# Patient Record
Sex: MALE | Race: WHITE | Employment: FULL TIME | URBAN - METROPOLITAN AREA
[De-identification: names, ages, dates, MRNs, and addresses within clinical notes are randomized per-mention and may not be internally consistent; named-entity substitution may affect disease eponyms.]

---

## 2017-03-28 ENCOUNTER — HOSPITAL ENCOUNTER (EMERGENCY)
Age: 57
Discharge: HOME | End: 2017-03-28
Attending: EMERGENCY MEDICINE
Payer: COMMERCIAL

## 2017-03-28 ENCOUNTER — APPOINTMENT (OUTPATIENT)
Dept: GENERAL RADIOLOGY | Age: 57
End: 2017-03-28
Attending: EMERGENCY MEDICINE
Payer: COMMERCIAL

## 2017-03-28 VITALS
TEMPERATURE: 98.1 F | HEART RATE: 64 BPM | SYSTOLIC BLOOD PRESSURE: 122 MMHG | RESPIRATION RATE: 10 BRPM | BODY MASS INDEX: 27.07 KG/M2 | HEIGHT: 69 IN | OXYGEN SATURATION: 96 % | DIASTOLIC BLOOD PRESSURE: 77 MMHG | WEIGHT: 182.76 LBS

## 2017-03-28 DIAGNOSIS — R07.89 ATYPICAL CHEST PAIN: Primary | ICD-10-CM

## 2017-03-28 LAB
ALBUMIN SERPL BCP-MCNC: 4 G/DL (ref 3.5–5)
ALBUMIN/GLOB SERPL: 1.1 {RATIO} (ref 1.1–2.2)
ALP SERPL-CCNC: 59 U/L (ref 45–117)
ALT SERPL-CCNC: 39 U/L (ref 12–78)
ANION GAP BLD CALC-SCNC: 10 MMOL/L (ref 5–15)
AST SERPL W P-5'-P-CCNC: 20 U/L (ref 15–37)
ATRIAL RATE: 76 BPM
BASOPHILS # BLD AUTO: 0.1 K/UL (ref 0–0.1)
BASOPHILS # BLD: 1 % (ref 0–1)
BILIRUB SERPL-MCNC: 0.5 MG/DL (ref 0.2–1)
BUN SERPL-MCNC: 24 MG/DL (ref 6–20)
BUN/CREAT SERPL: 18 (ref 12–20)
CALCIUM SERPL-MCNC: 8.4 MG/DL (ref 8.5–10.1)
CALCULATED P AXIS, ECG09: 35 DEGREES
CALCULATED R AXIS, ECG10: 55 DEGREES
CALCULATED T AXIS, ECG11: 64 DEGREES
CHLORIDE SERPL-SCNC: 104 MMOL/L (ref 97–108)
CK SERPL-CCNC: 101 U/L (ref 39–308)
CO2 SERPL-SCNC: 26 MMOL/L (ref 21–32)
CREAT SERPL-MCNC: 1.32 MG/DL (ref 0.7–1.3)
D DIMER PPP FEU-MCNC: 0.26 MG/L FEU (ref 0–0.65)
DIAGNOSIS, 93000: NORMAL
EOSINOPHIL # BLD: 0.2 K/UL (ref 0–0.4)
EOSINOPHIL NFR BLD: 2 % (ref 0–7)
ERYTHROCYTE [DISTWIDTH] IN BLOOD BY AUTOMATED COUNT: 13 % (ref 11.5–14.5)
GLOBULIN SER CALC-MCNC: 3.5 G/DL (ref 2–4)
GLUCOSE SERPL-MCNC: 83 MG/DL (ref 65–100)
HCT VFR BLD AUTO: 41.8 % (ref 36.6–50.3)
HGB BLD-MCNC: 14.2 G/DL (ref 12.1–17)
LIPASE SERPL-CCNC: 133 U/L (ref 73–393)
LYMPHOCYTES # BLD AUTO: 22 % (ref 12–49)
LYMPHOCYTES # BLD: 1.7 K/UL (ref 0.8–3.5)
MCH RBC QN AUTO: 29.6 PG (ref 26–34)
MCHC RBC AUTO-ENTMCNC: 34 G/DL (ref 30–36.5)
MCV RBC AUTO: 87.3 FL (ref 80–99)
MONOCYTES # BLD: 1 K/UL (ref 0–1)
MONOCYTES NFR BLD AUTO: 13 % (ref 5–13)
NEUTS SEG # BLD: 5 K/UL (ref 1.8–8)
NEUTS SEG NFR BLD AUTO: 62 % (ref 32–75)
P-R INTERVAL, ECG05: 122 MS
PLATELET # BLD AUTO: 179 K/UL (ref 150–400)
POTASSIUM SERPL-SCNC: 3.7 MMOL/L (ref 3.5–5.1)
PROT SERPL-MCNC: 7.5 G/DL (ref 6.4–8.2)
Q-T INTERVAL, ECG07: 368 MS
QRS DURATION, ECG06: 96 MS
QTC CALCULATION (BEZET), ECG08: 414 MS
RBC # BLD AUTO: 4.79 M/UL (ref 4.1–5.7)
SODIUM SERPL-SCNC: 140 MMOL/L (ref 136–145)
TROPONIN I SERPL-MCNC: <0.04 NG/ML
VENTRICULAR RATE, ECG03: 76 BPM
WBC # BLD AUTO: 7.9 K/UL (ref 4.1–11.1)

## 2017-03-28 PROCEDURE — 85025 COMPLETE CBC W/AUTO DIFF WBC: CPT | Performed by: EMERGENCY MEDICINE

## 2017-03-28 PROCEDURE — 99285 EMERGENCY DEPT VISIT HI MDM: CPT

## 2017-03-28 PROCEDURE — 93005 ELECTROCARDIOGRAM TRACING: CPT

## 2017-03-28 PROCEDURE — 85379 FIBRIN DEGRADATION QUANT: CPT | Performed by: EMERGENCY MEDICINE

## 2017-03-28 PROCEDURE — 82550 ASSAY OF CK (CPK): CPT | Performed by: EMERGENCY MEDICINE

## 2017-03-28 PROCEDURE — 80053 COMPREHEN METABOLIC PANEL: CPT | Performed by: EMERGENCY MEDICINE

## 2017-03-28 PROCEDURE — 83690 ASSAY OF LIPASE: CPT | Performed by: EMERGENCY MEDICINE

## 2017-03-28 PROCEDURE — 71010 XR CHEST PORT: CPT

## 2017-03-28 PROCEDURE — 36415 COLL VENOUS BLD VENIPUNCTURE: CPT | Performed by: EMERGENCY MEDICINE

## 2017-03-28 PROCEDURE — 84484 ASSAY OF TROPONIN QUANT: CPT | Performed by: EMERGENCY MEDICINE

## 2017-03-28 NOTE — ED NOTES
Patient arrives for left sided chest pain since Wednesday. Reports that if feels like a pressure. Denies SOB, n/v, diaphoresis, syncope with the episodes. Denies swelling on legs. Patient has no medical hx. No distress noted. Will continue to monitor.

## 2017-03-28 NOTE — ED PROVIDER NOTES
HPI Comments: Maty Casarez is a 61 y.o. male with history of cholecystectomy who presents ambulatory to ED c/o intermittent episodes of 2/10 left sided chest pain for the past 6 days, along with associated light headedness. The pain has been worsening since his arrival to the ED. Pt also notes ongoing issues with right shoulder pain that is worse with movement. Pt states the episodes last for a few minutes before subsiding, and it is exacerbated by deep inspiration. The chest pain is not exacerbated by exertion, movement, or by eating. Pt denies any shortness of breath, nausea, or vomiting. He has never been a smoker, and he has never been evaluated by cardiology. Pt's father had a \"mild heart attack\" last year. There are no other complaints, changes or physical findings at this time. The history is provided by the patient. No  was used. History reviewed. No pertinent past medical history. Past Surgical History:   Procedure Laterality Date    HX CHOLECYSTECTOMY           History reviewed. No pertinent family history. Social History     Social History    Marital status:      Spouse name: N/A    Number of children: N/A    Years of education: N/A     Occupational History    Not on file. Social History Main Topics    Smoking status: Never Smoker    Smokeless tobacco: Not on file    Alcohol use No    Drug use: No    Sexual activity: Not on file     Other Topics Concern    Not on file     Social History Narrative    No narrative on file         ALLERGIES: Review of patient's allergies indicates not on file. Review of Systems   Respiratory: Negative for shortness of breath. Cardiovascular: Positive for chest pain. Gastrointestinal: Negative for nausea and vomiting. Musculoskeletal: Positive for arthralgias. Neurological: Positive for light-headedness. All other systems reviewed and are negative.       Patient Vitals for the past 12 hrs: Temp Pulse Resp BP SpO2   03/28/17 1700 - 64 12 121/77 97 %   03/28/17 1630 - 60 10 122/75 96 %   03/28/17 1600 - 63 11 - 96 %   03/28/17 1530 - (!) 58 11 121/72 96 %   03/28/17 1500 - 60 11 118/73 97 %   03/28/17 1430 - 68 18 136/56 97 %   03/28/17 1332 98.1 °F (36.7 °C) 70 18 163/79 100 %       Physical Exam   Vital signs and nursing notes reviewed    CONSTITUTIONAL: Alert, in no apparent distress; well-developed; well-nourished. HEAD:  Normocephalic, atraumatic  EYES: PERRL; EOM's intact. ENTM: Nose: no rhinorrhea; Throat: no erythema or exudate, mucous membranes moist  Neck:  Supple. trachea is midline. RESP: Chest clear, equal breath sounds. - W/R/R  CV: S1 and S2 WNL; No murmurs, gallops or rubs. 2+ radial and DP pulses bilaterally. No reproducible chest wall tenderness. GI: non-distended, normal bowel sounds, abdomen soft and non-tender. No masses or organomegaly. : No costo-vertebral angle tenderness. BACK:  Non-tender, normal appearance  UPPER EXT:  Normal inspection. no joint or soft tissue swelling  LOWER EXT: No edema, no calf tenderness. NEURO: Alert and oriented x3, 5/5 strength and light touch sensation intact in bilateral upper and lower extremities. SKIN: No rashes; Warm and dry  PSYCH: Normal mood, normal affect    MDM  Number of Diagnoses or Management Options  Diagnosis management comments: 61year old male with no risk factors for ACS with atypical chest pain for 6 days. Will screen with xray, EKG, labs (including D-dimer). If reassuring will discharge home with cardiology follow up.         Amount and/or Complexity of Data Reviewed  Clinical lab tests: reviewed and ordered  Tests in the radiology section of CPT®: ordered and reviewed  Tests in the medicine section of CPT®: ordered and reviewed  Review and summarize past medical records: yes  Independent visualization of images, tracings, or specimens: yes    Patient Progress  Patient progress: stable    ED Course Procedures    EKG interpretation: (Preliminary) 13:38  Rhythm: normal sinus rhythm; and occasional PAC's. Rate (approx.): 76; Axis: normal; P wave: normal; QRS interval: normal ; ST/T wave: normal; no acute ischemic changes. Written by Price Melendez. Noy Negrete, ED Scribe, as dictated by Dom Rivera MD.    LABORATORY TESTS:  Recent Results (from the past 12 hour(s))   EKG, 12 LEAD, INITIAL    Collection Time: 03/28/17  1:38 PM   Result Value Ref Range    Ventricular Rate 76 BPM    Atrial Rate 76 BPM    P-R Interval 122 ms    QRS Duration 96 ms    Q-T Interval 368 ms    QTC Calculation (Bezet) 414 ms    Calculated P Axis 35 degrees    Calculated R Axis 55 degrees    Calculated T Axis 64 degrees    Diagnosis       Sinus rhythm with premature atrial complexes  Otherwise normal ECG  No previous ECGs available     CBC WITH AUTOMATED DIFF    Collection Time: 03/28/17  1:54 PM   Result Value Ref Range    WBC 7.9 4.1 - 11.1 K/uL    RBC 4.79 4.10 - 5.70 M/uL    HGB 14.2 12.1 - 17.0 g/dL    HCT 41.8 36.6 - 50.3 %    MCV 87.3 80.0 - 99.0 FL    MCH 29.6 26.0 - 34.0 PG    MCHC 34.0 30.0 - 36.5 g/dL    RDW 13.0 11.5 - 14.5 %    PLATELET 488 649 - 607 K/uL    NEUTROPHILS 62 32 - 75 %    LYMPHOCYTES 22 12 - 49 %    MONOCYTES 13 5 - 13 %    EOSINOPHILS 2 0 - 7 %    BASOPHILS 1 0 - 1 %    ABS. NEUTROPHILS 5.0 1.8 - 8.0 K/UL    ABS. LYMPHOCYTES 1.7 0.8 - 3.5 K/UL    ABS. MONOCYTES 1.0 0.0 - 1.0 K/UL    ABS. EOSINOPHILS 0.2 0.0 - 0.4 K/UL    ABS.  BASOPHILS 0.1 0.0 - 0.1 K/UL   METABOLIC PANEL, COMPREHENSIVE    Collection Time: 03/28/17  1:54 PM   Result Value Ref Range    Sodium 140 136 - 145 mmol/L    Potassium 3.7 3.5 - 5.1 mmol/L    Chloride 104 97 - 108 mmol/L    CO2 26 21 - 32 mmol/L    Anion gap 10 5 - 15 mmol/L    Glucose 83 65 - 100 mg/dL    BUN 24 (H) 6 - 20 MG/DL    Creatinine 1.32 (H) 0.70 - 1.30 MG/DL    BUN/Creatinine ratio 18 12 - 20      GFR est AA >60 >60 ml/min/1.73m2    GFR est non-AA 56 (L) >60 ml/min/1.73m2 Calcium 8.4 (L) 8.5 - 10.1 MG/DL    Bilirubin, total 0.5 0.2 - 1.0 MG/DL    ALT (SGPT) 39 12 - 78 U/L    AST (SGOT) 20 15 - 37 U/L    Alk. phosphatase 59 45 - 117 U/L    Protein, total 7.5 6.4 - 8.2 g/dL    Albumin 4.0 3.5 - 5.0 g/dL    Globulin 3.5 2.0 - 4.0 g/dL    A-G Ratio 1.1 1.1 - 2.2     CK W/ REFLX CKMB    Collection Time: 03/28/17  1:54 PM   Result Value Ref Range     39 - 308 U/L   TROPONIN I    Collection Time: 03/28/17  1:54 PM   Result Value Ref Range    Troponin-I, Qt. <0.04 <0.05 ng/mL   LIPASE    Collection Time: 03/28/17  1:54 PM   Result Value Ref Range    Lipase 133 73 - 393 U/L   D DIMER    Collection Time: 03/28/17  3:09 PM   Result Value Ref Range    D-dimer 0.26 0.00 - 0.65 mg/L FEU   EKG, 12 LEAD, INITIAL    Collection Time: 03/28/17  3:09 PM   Result Value Ref Range    Ventricular Rate 65 BPM    Atrial Rate 65 BPM    P-R Interval 128 ms    QRS Duration 88 ms    Q-T Interval 386 ms    QTC Calculation (Bezet) 401 ms    Calculated P Axis 32 degrees    Calculated R Axis 54 degrees    Calculated T Axis 80 degrees    Diagnosis       Normal sinus rhythm  When compared with ECG of 28-MAR-2017 13:38,  MANUAL COMPARISON REQUIRED, DATA IS UNCONFIRMED         IMAGING RESULTS:  CXR Results  (Last 48 hours)               03/28/17 1439  XR CHEST PORT Final result    Impression:  Impression: No acute process. Narrative: Indication: Left-sided chest pain for one week       Comparison: None       Portable exam of the chest obtained at 1432 demonstrates normal heart size. There is no acute process in the lung fields. The osseous structures are   unremarkable. MEDICATIONS GIVEN:  Medications - No data to display    IMPRESSION:  1. Atypical chest pain        PLAN:  1.    Follow-up Information     Follow up With Details Comments Keegan Campbell MD In 1 week for follow-up about your chest pain [address]  [address]  [facility]  [phone] Return to ED if worse       DISCHARGE NOTE  5:52 PM  The patient has been re-evaluated and is ready for discharge. Reviewed available results with patient. Counseled pt on diagnosis and care plan. Pt has expressed understanding, and all questions have been answered. Pt agrees with plan and agrees to follow up as recommended, or return to the ED if their symptoms worsen. Discharge instructions have been provided and explained to the pt, along with reasons to return to the ED. Attestations: This note is prepared by Sully Ricketts. Yifan Vasques, acting as Scribe for Polly Olguin MD.    Polly Olguin MD: The scribe's documentation has been prepared under my direction and personally reviewed by me in its entirety. I confirm that the note above accurately reflects all work, treatment, procedures, and medical decision making performed by me.

## 2017-03-28 NOTE — DISCHARGE INSTRUCTIONS
You were seen in the emergency department for chest pain. Your evaluation today was reassuring but you should see a cardiologist for a follow-up exam and possible additional heart testing. Return for new chest pain, difficulty breathing or other new concerning symptoms. Chest Pain: Care Instructions  Your Care Instructions  There are many things that can cause chest pain. Some are not serious and will get better on their own in a few days. But some kinds of chest pain need more testing and treatment. Your doctor may have recommended a follow-up visit in the next 8 to 12 hours. If you are not getting better, you may need more tests or treatment. Even though your doctor has released you, you still need to watch for any problems. The doctor carefully checked you, but sometimes problems can develop later. If you have new symptoms or if your symptoms do not get better, get medical care right away. If you have worse or different chest pain or pressure that lasts more than 5 minutes or you passed out (lost consciousness), call 911 or seek other emergency help right away. A medical visit is only one step in your treatment. Even if you feel better, you still need to do what your doctor recommends, such as going to all suggested follow-up appointments and taking medicines exactly as directed. This will help you recover and help prevent future problems. How can you care for yourself at home? · Rest until you feel better. · Take your medicine exactly as prescribed. Call your doctor if you think you are having a problem with your medicine. · Do not drive after taking a prescription pain medicine. When should you call for help? Call 911 if:  · You passed out (lost consciousness). · You have severe difficulty breathing. · You have symptoms of a heart attack. These may include:  ¨ Chest pain or pressure, or a strange feeling in your chest.  ¨ Sweating. ¨ Shortness of breath. ¨ Nausea or vomiting.   ¨ Pain, pressure, or a strange feeling in your back, neck, jaw, or upper belly or in one or both shoulders or arms. ¨ Lightheadedness or sudden weakness. ¨ A fast or irregular heartbeat. After you call 911, the  may tell you to chew 1 adult-strength or 2 to 4 low-dose aspirin. Wait for an ambulance. Do not try to drive yourself. Call your doctor today if:  · You have any trouble breathing. · Your chest pain gets worse. · You are dizzy or lightheaded, or you feel like you may faint. · You are not getting better as expected. · You are having new or different chest pain. Where can you learn more? Go to http://christine-ulises.info/. Enter A120 in the search box to learn more about \"Chest Pain: Care Instructions. \"  Current as of: May 27, 2016  Content Version: 11.2  © 8728-8072 [employer]. Care instructions adapted under license by [employer] (which disclaims liability or warranty for this information). If you have questions about a medical condition or this instruction, always ask your healthcare professional. [place] [address] any warranty or liability for your use of this information.

## 2017-03-29 LAB
ATRIAL RATE: 65 BPM
CALCULATED P AXIS, ECG09: 32 DEGREES
CALCULATED R AXIS, ECG10: 54 DEGREES
CALCULATED T AXIS, ECG11: 80 DEGREES
DIAGNOSIS, 93000: NORMAL
P-R INTERVAL, ECG05: 128 MS
Q-T INTERVAL, ECG07: 386 MS
QRS DURATION, ECG06: 88 MS
QTC CALCULATION (BEZET), ECG08: 401 MS
VENTRICULAR RATE, ECG03: 65 BPM

## 2018-08-06 ENCOUNTER — CLINICAL SUPPORT (OUTPATIENT)
Dept: CARDIOLOGY CLINIC | Age: 58
End: 2018-08-06

## 2018-08-06 ENCOUNTER — OFFICE VISIT (OUTPATIENT)
Dept: CARDIOLOGY CLINIC | Age: 58
End: 2018-08-06

## 2018-08-06 VITALS
HEIGHT: 69 IN | WEIGHT: 192.2 LBS | RESPIRATION RATE: 18 BRPM | SYSTOLIC BLOOD PRESSURE: 142 MMHG | OXYGEN SATURATION: 97 % | DIASTOLIC BLOOD PRESSURE: 80 MMHG | HEART RATE: 59 BPM | BODY MASS INDEX: 28.47 KG/M2

## 2018-08-06 DIAGNOSIS — R06.09 DOE (DYSPNEA ON EXERTION): ICD-10-CM

## 2018-08-06 DIAGNOSIS — R03.0 ELEVATED BLOOD PRESSURE READING: ICD-10-CM

## 2018-08-06 DIAGNOSIS — R07.89 OTHER CHEST PAIN: Primary | ICD-10-CM

## 2018-08-06 DIAGNOSIS — I20.9 ANGINA, CLASS II (HCC): Primary | ICD-10-CM

## 2018-08-06 RX ORDER — ASPIRIN 81 MG/1
81 TABLET ORAL DAILY
Qty: 30 TAB | Refills: 6 | Status: SHIPPED | OUTPATIENT
Start: 2018-08-06 | End: 2022-08-15

## 2018-08-06 RX ORDER — NITROGLYCERIN 0.4 MG/1
0.4 TABLET SUBLINGUAL
Qty: 1 BOTTLE | Refills: 0 | Status: SHIPPED | OUTPATIENT
Start: 2018-08-06 | End: 2019-11-15 | Stop reason: SDUPTHER

## 2018-08-06 RX ORDER — METOPROLOL SUCCINATE 25 MG/1
25 TABLET, EXTENDED RELEASE ORAL
Qty: 30 TAB | Refills: 1 | Status: SHIPPED | OUTPATIENT
Start: 2018-08-06 | End: 2018-10-08 | Stop reason: SDUPTHER

## 2018-08-06 NOTE — PROGRESS NOTES
Identified patient using full name and . Patient education for testing complete. Patient verbalized understanding.     Mathew Goddard RN

## 2018-08-06 NOTE — PROGRESS NOTES
1. Have you been to the ER, urgent care clinic since your last visit? Hospitalized since your last visit? NO    2. Have you seen or consulted any other health care providers outside of the [facility] since your last visit? Include any pap smears or colon screening. YES , DR. RUANO. NEW PATIENT. C/O DULL ACHING CHEST PAIN OFF AND ON, SOB WITH EXERTION.

## 2018-08-06 NOTE — PROGRESS NOTES
Trev Romano DNP, ANP-BC  Subjective/HPI:     Maria G Leyva is a 61 y.o. male is here for new patient consultation regarding 3 week onset of left anterior chest discomfort described as a dull ache/pain crescendoing to a maximum of 5/10 discomfort. He reports the symptoms can occur at rest however with activity they do increase from time to time. He is also noticing significant amount of fatigue and new onset of dyspnea on exertion. He reports being in a usual healthy state of health however seldom is ever seen unless sick. Unknown lipid panel, he presents hypertensive today, [facility] his blood pressure 150/80 on July 31. His cardiac risk factors include male, elevated BMI, family history of atherosclerotic heart disease and CVA, unknown lipid status. Denies EtOH or tobacco use. PCP Provider  None  History reviewed. No pertinent past medical history. Past Surgical History:   Procedure Laterality Date    HX CHOLECYSTECTOMY       No Known Allergies   Family History   Problem Relation Age of Onset    Stroke Mother     Coronary Artery Disease Father       No current outpatient prescriptions on file. No current facility-administered medications for this visit. Vitals:    08/06/18 1425 08/06/18 1431   BP: 148/80 142/80   Pulse: (!) 59    Resp: 18    SpO2: 97%    Weight: 192 lb 3.2 oz (87.2 kg)    Height: 5' 9\" (1.753 m)      Social History     Social History    Marital status: UNKNOWN     Spouse name: N/A    Number of children: N/A    Years of education: N/A     Occupational History    Not on file.      Social History Main Topics    Smoking status: Never Smoker    Smokeless tobacco: Never Used    Alcohol use No    Drug use: No    Sexual activity: Not on file     Other Topics Concern    Not on file     Social History Narrative       I have reviewed the nurses notes, vitals, problem list, allergy list, medical history, family, social history and medications. Review of Symptoms:    General: Pt denies excessive weight gain or loss. Pt is able to conduct ADL's however developing increasing fatigue with his usual activities of daily life  HEENT: Denies blurred vision, headaches, epistaxis and difficulty swallowing. Respiratory: Denies shortness of breath, + BLANCO, wheezing or stridor. Cardiovascular: + Chest discomfort, denies palpitations, edema or PND  Gastrointestinal: Denies poor appetite, indigestion, abdominal pain or blood in stool  Musculoskeletal: Denies pain or swelling from muscles or joints  Neurologic: Denies tremor, paresthesias, or sensory motor disturbance  Skin: Denies rash, itching or texture change. Physical Exam:      General: Well developed, in no acute distress, cooperative and alert  HEENT: No carotid bruits, no JVD, trach is midline. Neck Supple, PEERL, EOM intact. Heart:  Normal S1/S2 negative S3 or S4. Regular, no murmur, gallop or rub.   Respiratory: Clear bilaterally x 4, no wheezing or rales  Abdomen:   Soft, non-tender, no masses, bowel sounds are active.   Extremities:  No edema, normal cap refill, no cyanosis, atraumatic. Neuro: A&Ox3, speech clear, gait stable. Skin: Skin color is normal. No rashes or lesions.  Non diaphoretic  Vascular: 2+ pulses symmetric in all extremities    Cardiographics    ECG: Normal sinus rhythm  Results for orders placed or performed during the hospital encounter of 03/28/17   EKG, 12 LEAD, INITIAL   Result Value Ref Range    Ventricular Rate 76 BPM    Atrial Rate 76 BPM    P-R Interval 122 ms    QRS Duration 96 ms    Q-T Interval 368 ms    QTC Calculation (Bezet) 414 ms    Calculated P Axis 35 degrees    Calculated R Axis 55 degrees    Calculated T Axis 64 degrees    Diagnosis       Sinus rhythm with premature atrial complexes  Confirmed by Raciel Crystal ([id number]) on 3/28/2017 9:58:38 PM           Cardiology Labs:  No results found for: CHOL, CHOLX, CHLST, CHOLV, [id number], HDL, LDL, LDLC, Peter Unger, [facility], [facility]    Lab Results   Component Value Date/Time    Sodium 140 03/28/2017 01:54 PM    Potassium 3.7 03/28/2017 01:54 PM    Chloride 104 03/28/2017 01:54 PM    CO2 26 03/28/2017 01:54 PM    Anion gap 10 03/28/2017 01:54 PM    Glucose 83 03/28/2017 01:54 PM    BUN 24 (H) 03/28/2017 01:54 PM    Creatinine 1.32 (H) 03/28/2017 01:54 PM    BUN/Creatinine ratio 18 03/28/2017 01:54 PM    GFR est AA >60 03/28/2017 01:54 PM    GFR est non-AA 56 (L) 03/28/2017 01:54 PM    Calcium 8.4 (L) 03/28/2017 01:54 PM    Bilirubin, total 0.5 03/28/2017 01:54 PM    AST (SGOT) 20 03/28/2017 01:54 PM    Alk. phosphatase 59 03/28/2017 01:54 PM    Protein, total 7.5 03/28/2017 01:54 PM    Albumin 4.0 03/28/2017 01:54 PM    Globulin 3.5 03/28/2017 01:54 PM    A-G Ratio 1.1 03/28/2017 01:54 PM    ALT (SGPT) 39 03/28/2017 01:54 PM           Assessment:     Assessment:     Diagnoses and all orders for this visit:    1. Angina, class II ([facility] [address]  -     AMB POC EKG ROUTINE W/ 12 LEADS, INTER & REP    2. BLANCO (dyspnea on exertion)    3. Elevated blood pressure reading        ICD-10-CM ICD-9-CM    1. Angina, class II (HCC) I20.9 413.9 AMB POC EKG ROUTINE W/ 12 LEADS, INTER & REP   2. BLANCO (dyspnea on exertion) R06.09 786.09    3. Elevated blood pressure reading R03.0 796.2      Orders Placed This Encounter    AMB POC EKG ROUTINE W/ 12 LEADS, INTER & REP     Order Specific Question:   Reason for Exam:     Answer:   routine        Plan:     Patient is a 69-year-old male with a history of progressive left anterior chest discomfort initially starting 3 weeks ago with associated increasing dyspnea on exertion and fatigue. His symptoms are consistent with angina functional class II with cardiac risk factors of male, elevated BMI, hypertension, family history of coronary artery disease and unknown lipid status.   We will proceed with ischemic evaluation via stress echocardiogram.  Treatment plan dependent on test outcome. 3:58 PM  EKG demonstrates  abnormality showing ST depressions in the inferior wall, blood pressure elevated to systolic 767 mmHg at peak exercise, per echo technologist there was no hypokinesis on preliminary images however report is not finalized. Will treat patient with metoprolol XL 25 mg nightly for blood pressure and cardiac protection, as needed sublingual nitroglycerin and start enteric-coated aspirin. Patient has been scheduled a follow-up appointment with Dr. Arik Winter to review test results and blood pressure recheck. Brittany Stevens NP    This note was created using voice recognition software. Despite editing, there may be syntax errors.

## 2018-08-06 NOTE — MR AVS SNAPSHOT
[address] [facility] [address] 
[phone] Patient: Sean Higgins MRN: [id number] FBP:[birth year] Visit Information Date & Time Provider Department Dept. Phone Encounter #  
 8/6/2018  2:30 PM Abraham Porras, [facility] [phone] [id number] Follow-up Instructions Return in about 2 weeks (around 8/20/2018). Follow-up and Disposition History Your Appointments 8/20/2018  3:00 PM  
ESTABLISHED PATIENT with [facility]) Appt Note: Per Ray Morin, 2 week f/u with Dr. Miguel Carrillo, **HAS TO SEE **-[facility]08/06/2018  
 [facility]  
[phone] Kin[facility]al [facility] Upcoming Health Maintenance Date Due Hepatitis C Screening 1960 DTaP/Tdap/Td series (1 - Tdap) 9/8/1980 FOBT Q 1 YEAR AGE 50-75 9/8/2009 Influenza Age 5 to Adult 8/1/2018 Allergies as of 8/6/2018  Review Complete On: 8/6/2018 By: Alvin Simmons LPN No Known Allergies Current Immunizations  Never Reviewed No immunizations on file. Not reviewed this visit You Were Diagnosed With   
  
 Codes Comments Angina, class II ([facility] [address]    -  Primary ICD-10-CM: I20.9 ICD-9-CM: 413.9 BLANCO (dyspnea on exertion)     ICD-10-CM: R06.09 
ICD-9-CM: 786.09 Elevated blood pressure reading     ICD-10-CM: R03.0 ICD-9-CM: 796.2 Vitals BP Pulse Resp Height(growth percentile) Weight(growth percentile) SpO2  
 142/80 (BP 1 Location: Right arm, BP Patient Position: Sitting) (!) 59 18 5' 9\" (1.753 m) 192 lb 3.2 oz (87.2 kg) 97% BMI Smoking Status 28.38 kg/m2 Never Smoker Vitals History BMI and BSA Data Body Mass Index Body Surface Area  
 28.38 kg/m 2 2.06 m 2 Your Updated Medication List  
  
   
 This list is accurate as of 18  4:01 PM.  Always use your most recent med list.  
  
  
  
  
 aspirin delayed-release 81 mg tablet Take 1 Tab by mouth daily. metoprolol succinate 25 mg XL tablet Commonly known as:  TOPROL-XL Take 1 Tab by mouth nightly. nitroglycerin 0.4 mg SL tablet Commonly known as:  NITROSTAT  
1 Tab by SubLINGual route every five (5) minutes as needed for Chest Pain. Up to 3 doses. Prescriptions Printed Refills  
 metoprolol succinate (TOPROL-XL) 25 mg XL tablet 1 Sig: Take 1 Tab by mouth nightly. Class: Print Route: Oral  
 aspirin delayed-release 81 mg tablet 6 Sig: Take 1 Tab by mouth daily. Class: Print Route: Oral  
 nitroglycerin (NITROSTAT) 0.4 mg SL tablet 0 Si Tab by SubLINGual route every five (5) minutes as needed for Chest Pain. Up to 3 doses. Class: Print Route: SubLINGual  
  
We Performed the Following AMB POC EKG ROUTINE W/ 12 LEADS, INTER & REP [95689 CPT(R)] CBC WITH AUTOMATED DIFF [24878 CPT(R)] CK [id number] CPT(R)] ECHO TTE STRESS COMP W OR WO CONTR [10425 Custom] LIPID PANEL [73608 CPT(R)] METABOLIC PANEL, COMPREHENSIVE [50650 CPT(R)] THYROID PANEL W/TSH [76041 CPT(R)] Follow-up Instructions Return in about 2 weeks (around 2018). Introducing [facility] & HEALTH SERVICES! Rhys Barrientos introduces [employer] patient portal. Now you can access parts of your medical record, email your doctor's office, and request medication refills online. 1. In your internet browser, go to [email or web address]. Pass[employer] 2. Click on the First Time User? Click Here link in the Sign In box. You will see the New Member Sign Up page. 3. Enter your [employer] Access Code exactly as it appears below. You will not need to use this code after youve completed the sign-up process. If you do not sign up before the expiration date, you must request a new code. · [employer] Access Code: [email or web address] Expires: 11/4/2018  3:15 PM 
 
4. Enter the last four digits of your Social Security Number (xxxx) and Date of Birth (mm/dd/yyyy) as indicated and click Submit. You will be taken to the next sign-up page. 5. Create a [employer] ID. This will be your [employer] login ID and cannot be changed, so think of one that is secure and easy to remember. 6. Create a [employer] password. You can change your password at any time. 7. Enter your Password Reset Question and Answer. This can be used at a later time if you forget your password. 8. Enter your e-mail address. You will receive e-mail notification when new information is available in [address] 9. Click Sign Up. You can now view and download portions of your medical record. 10. Click the Download Summary menu link to download a portable copy of your medical information. If you have questions, please visit the Frequently Asked Questions section of the [employer] website. Remember, [employer] is NOT to be used for urgent needs. For medical emergencies, dial 911. Now available from your iPhone and Android! Please provide this summary of care documentation to your next provider. Your primary care clinician is listed as NONE. If you have any questions after today's visit, please call [phone].

## 2018-08-08 LAB
ALBUMIN SERPL-MCNC: 4.5 G/DL (ref 3.5–5.5)
ALBUMIN/GLOB SERPL: 1.7 {RATIO} (ref 1.2–2.2)
ALP SERPL-CCNC: 62 IU/L (ref 39–117)
ALT SERPL-CCNC: 58 IU/L (ref 0–44)
AST SERPL-CCNC: 32 IU/L (ref 0–40)
BASOPHILS # BLD AUTO: 0 X10E3/UL (ref 0–0.2)
BASOPHILS NFR BLD AUTO: 1 %
BILIRUB SERPL-MCNC: 0.3 MG/DL (ref 0–1.2)
BUN SERPL-MCNC: 15 MG/DL (ref 6–24)
BUN/CREAT SERPL: 12 (ref 9–20)
CALCIUM SERPL-MCNC: 9.2 MG/DL (ref 8.7–10.2)
CHLORIDE SERPL-SCNC: 102 MMOL/L (ref 96–106)
CHOLEST SERPL-MCNC: 135 MG/DL (ref 100–199)
CK SERPL-CCNC: 162 U/L (ref 24–204)
CO2 SERPL-SCNC: 24 MMOL/L (ref 20–29)
CREAT SERPL-MCNC: 1.25 MG/DL (ref 0.76–1.27)
EOSINOPHIL # BLD AUTO: 0.2 X10E3/UL (ref 0–0.4)
EOSINOPHIL NFR BLD AUTO: 2 %
ERYTHROCYTE [DISTWIDTH] IN BLOOD BY AUTOMATED COUNT: 13.7 % (ref 12.3–15.4)
FT4I SERPL CALC-MCNC: 2.4 (ref 1.2–4.9)
GLOBULIN SER CALC-MCNC: 2.7 G/DL (ref 1.5–4.5)
GLUCOSE SERPL-MCNC: 87 MG/DL (ref 65–99)
HCT VFR BLD AUTO: 41.3 % (ref 37.5–51)
HDLC SERPL-MCNC: 33 MG/DL
HGB BLD-MCNC: 14 G/DL (ref 13–17.7)
IMM GRANULOCYTES # BLD: 0 X10E3/UL (ref 0–0.1)
IMM GRANULOCYTES NFR BLD: 1 %
INTERPRETATION, 910389: NORMAL
LDLC SERPL CALC-MCNC: 72 MG/DL (ref 0–99)
LYMPHOCYTES # BLD AUTO: 2.2 X10E3/UL (ref 0.7–3.1)
LYMPHOCYTES NFR BLD AUTO: 27 %
MCH RBC QN AUTO: 29.7 PG (ref 26.6–33)
MCHC RBC AUTO-ENTMCNC: 33.9 G/DL (ref 31.5–35.7)
MCV RBC AUTO: 88 FL (ref 79–97)
MONOCYTES # BLD AUTO: 1.2 X10E3/UL (ref 0.1–0.9)
MONOCYTES NFR BLD AUTO: 15 %
NEUTROPHILS # BLD AUTO: 4.4 X10E3/UL (ref 1.4–7)
NEUTROPHILS NFR BLD AUTO: 54 %
PLATELET # BLD AUTO: 209 X10E3/UL (ref 150–379)
POTASSIUM SERPL-SCNC: 4.6 MMOL/L (ref 3.5–5.2)
PROT SERPL-MCNC: 7.2 G/DL (ref 6–8.5)
RBC # BLD AUTO: 4.72 X10E6/UL (ref 4.14–5.8)
SODIUM SERPL-SCNC: 142 MMOL/L (ref 134–144)
T3RU NFR SERPL: 29 % (ref 24–39)
T4 SERPL-MCNC: 8.4 UG/DL (ref 4.5–12)
TRIGL SERPL-MCNC: 151 MG/DL (ref 0–149)
TSH SERPL DL<=0.005 MIU/L-ACNC: 2.49 UIU/ML (ref 0.45–4.5)
VLDLC SERPL CALC-MCNC: 30 MG/DL (ref 5–40)
WBC # BLD AUTO: 8.1 X10E3/UL (ref 3.4–10.8)

## 2018-08-08 NOTE — PROGRESS NOTES
Selvin: Please call patient Dr. Kristina Gutierrez reviewed his stress test, his images look good however there was some abnormalities on his EKG. He should already have a follow-up appointment with Dr. Kristina Gutierrez in the books to review his test results. Continue current medications I placed him on for initial consultation.

## 2018-08-08 NOTE — PROGRESS NOTES
Spoke with patient  Verified patient with 2 patient identifiers  Informed per Josue Kulkarni NP labs okay will review at f/u appointment, confirmed F/U 8/20/2018 3pm

## 2018-08-08 NOTE — PROGRESS NOTES
Spoke with patient  Verified patient with 2 patient identifiers  Informed patient Dr. Charlotte Lee reviewed his stress test, his images look good however there was some abnormalities on his EKG. Need continue current medications. Patient verbalized understanding. Confirmed f/u appointment 8/20/2018 at 3 pm to review results.

## 2018-08-20 ENCOUNTER — OFFICE VISIT (OUTPATIENT)
Dept: CARDIOLOGY CLINIC | Age: 58
End: 2018-08-20

## 2018-08-20 VITALS
OXYGEN SATURATION: 99 % | HEART RATE: 76 BPM | DIASTOLIC BLOOD PRESSURE: 76 MMHG | HEIGHT: 69 IN | SYSTOLIC BLOOD PRESSURE: 126 MMHG | WEIGHT: 191.7 LBS | BODY MASS INDEX: 28.39 KG/M2

## 2018-08-20 DIAGNOSIS — R94.39 ABNORMAL STRESS ELECTROCARDIOGRAM TEST: ICD-10-CM

## 2018-08-20 DIAGNOSIS — I20.9 ANGINA, CLASS II (HCC): ICD-10-CM

## 2018-08-20 DIAGNOSIS — R06.09 DOE (DYSPNEA ON EXERTION): Primary | ICD-10-CM

## 2018-08-20 NOTE — PROGRESS NOTES
Anjum Feldman MD          NAME:  Juju Baldwin   :   1960   MRN:   [id number]   PCP:  None           Subjective: The patient is a 61y.o. year old male  who returns for a routine follow-up. Since the last visit, patient reports CP less, but has not really exerted himself. No past medical history on file. ICD-10-CM ICD-9-CM    1. BLANCO (dyspnea on exertion) R06.09 786.09    2. Angina, class II (HCC) I20.9 413.9    3. Abnormal stress electrocardiogram test R94.39 794.39       Social History   Substance Use Topics    Smoking status: Never Smoker    Smokeless tobacco: Never Used    Alcohol use No      Family History   Problem Relation Age of Onset    Stroke Mother     Coronary Artery Disease Father         Review of Systems  Cardiovascular: Negative except as noted in HPI      Objective:       Vitals:    18 1448 18 1455   BP: 130/78 126/76   Pulse: 76    SpO2: 99%    Weight: 191 lb 11.2 oz (87 kg)    Height: 5' 9\" (1.753 m)     Body mass index is 28.31 kg/(m^2). General PE  Mental Status - Alert. General Appearance - Not in acute distress. Chest and Lung Exam   Inspection: Accessory muscles - No use of accessory muscles in breathing. Auscultation:   Breath sounds: - Normal.    Cardiovascular   Inspection: Jugular vein - Bilateral - Inspection Normal.  Palpation/Percussion:   Apical Impulse: - Normal.  Auscultation: Rhythm - Regular. Heart Sounds - S1 WNL and S2 WNL. No S3 or S4. Murmurs & Other Heart Sounds: Auscultation of the heart reveals - No Murmurs. Peripheral Vascular   Upper Extremity: Inspection - Bilateral - No Cyanotic nailbeds or Digital clubbing. Lower Extremity:   Palpation: Edema - Bilateral - No edema. Data Review:     EKG -  EKG: normal EKG, normal sinus rhythm, unchanged from previous tracings.     LABS- @brieflabs@      Allergies reviewed  No Known Allergies    Medications reviewed  Current Outpatient Prescriptions   Medication Sig    metoprolol succinate (TOPROL-XL) 25 mg XL tablet Take 1 Tab by mouth nightly.  aspirin delayed-release 81 mg tablet Take 1 Tab by mouth daily.  nitroglycerin (NITROSTAT) 0.4 mg SL tablet 1 Tab by SubLINGual route every five (5) minutes as needed for Chest Pain. Up to 3 doses. No current facility-administered medications for this visit. Assessment:       ICD-10-CM ICD-9-CM    1. BLANCO (dyspnea on exertion) R06.09 786.09    2. Angina, class II (HCC) I20.9 413.9    3. Abnormal stress electrocardiogram test R94.39 794.39         No orders of the defined types were placed in this encounter. Plan:     Split decision stress echo with the images looking OK but the stress EKG very abnormal.  Will get stress myoview.     Linda Gonzalez MD

## 2018-08-20 NOTE — MR AVS SNAPSHOT
[address] [facility] 
[phone] Patient: Maria G Leyva MRN: [id number] TYV:5/6/4463 Visit Information Date & Time Provider Department Dept. Phone Encounter #  
 8/20/2018  3:00 PM Raulito Beckett, [address] [facility] [phone] Your Appointments 9/5/2018  8:00 AM  
NUCLEAR MEDICINE with NUCLEAR, [facility] [address]) Appt Note: per Dr. Gil Santana [address] [address] [facility]  
[phone] [address] [facility] Upcoming Health Maintenance Date Due Hepatitis C Screening 1960 DTaP/Tdap/Td series (1 - Tdap) 9/8/1980 FOBT Q 1 YEAR AGE 50-75 9/8/2009 Influenza Age 5 to Adult 8/1/2018 Allergies as of 8/20/2018  Review Complete On: 8/20/2018 By: Willam Teran No Known Allergies Current Immunizations  Never Reviewed No immunizations on file. Not reviewed this visit You Were Diagnosed With   
  
 Codes Comments BLANCO (dyspnea on exertion)    -  Primary ICD-10-CM: R06.09 
ICD-9-CM: 786.09 Angina, class II ([facility] [address]     ICD-10-CM: I20.9 ICD-9-CM: 413.9 Abnormal stress electrocardiogram test     ICD-10-CM: R94.39 
ICD-9-CM: 794.39 Vitals BP Pulse Height(growth percentile) Weight(growth percentile) SpO2 BMI  
 126/76 (BP 1 Location: Right arm, BP Patient Position: Sitting) 76 5' 9\" (1.753 m) 191 lb 11.2 oz (87 kg) 99% 28.31 kg/m2 Smoking Status Never Smoker Vitals History BMI and BSA Data Body Mass Index Body Surface Area  
 28.31 kg/m 2 2.06 m 2 Your Updated Medication List  
  
   
This list is accurate as of 8/20/18  3:37 PM.  Always use your most recent med list.  
  
  
  
  
 aspirin delayed-release 81 mg tablet Take 1 Tab by mouth daily. metoprolol succinate 25 mg XL tablet Commonly known as:  TOPROL-XL Take 1 Tab by mouth nightly. nitroglycerin 0.4 mg SL tablet Commonly known as:  NITROSTAT  
1 Tab by SubLINGual route every five (5) minutes as needed for Chest Pain. Up to 3 doses. To-Do List   
 08/20/2018 ECG:  STRESS TEST CARDIOLITE Introducing \[facility]\"" & HEALTH SERVICES! [facility] introduces [employer] patient portal. Now you can access parts of your medical record, email your doctor's office, and request medication refills online. 1. In your internet browser, go to [email or web address]://[email or web address]. Lu[employer]ink/[employer] 2. Click on the First Time User? Click Here link in the Sign In box. You will see the New Member Sign Up page. 3. Enter your [employer] Access Code exactly as it appears below. You will not need to use this code after youve completed the sign-up process. If you do not sign up before the expiration date, you must request a new code. · [employer] Access Code: [email or web address] Expires: 11/4/2018  3:15 PM 
 
4. Enter the last four digits of your Social Security Number (xxxx) and Date of Birth (mm/dd/yyyy) as indicated and click Submit. You will be taken to the next sign-up page. 5. Create a [employer] ID. This will be your [employer] login ID and cannot be changed, so think of one that is secure and easy to remember. 6. Create a [employer] password. You can change your password at any time. 7. Enter your Password Reset Question and Answer. This can be used at a later time if you forget your password. 8. Enter your e-mail address. You will receive e-mail notification when new information is available in [address] 9. Click Sign Up. You can now view and download portions of your medical record. 10. Click the Download Summary menu link to download a portable copy of your medical information.  
 
If you have questions, please visit the Frequently Asked Questions section of the [employer]. Remember, [employer] is NOT to be used for urgent needs. For medical emergencies, dial 911. Now available from your iPhone and Android! Please provide this summary of care documentation to your next provider. Your primary care clinician is listed as NONE. If you have any questions after today's visit, please call [phone].

## 2018-08-20 NOTE — PROGRESS NOTES
1. Have you been to the ER, urgent care clinic since your last visit? Hospitalized since your last visit? 2. Have you seen or consulted any other health care providers outside of the [address] [place] [address] since your last visit? Include any pap smears or colon screening.

## 2018-09-05 ENCOUNTER — CLINICAL SUPPORT (OUTPATIENT)
Dept: CARDIOLOGY CLINIC | Age: 58
End: 2018-09-05

## 2018-09-05 DIAGNOSIS — R06.09 DOE (DYSPNEA ON EXERTION): ICD-10-CM

## 2018-09-05 DIAGNOSIS — R94.39 ABNORMAL STRESS ELECTROCARDIOGRAM TEST: ICD-10-CM

## 2018-09-05 DIAGNOSIS — I20.9 ANGINA, CLASS II (HCC): ICD-10-CM

## 2018-09-07 NOTE — PROGRESS NOTES
Verified patient with two identifiers. Spoke with patient regarding STRESS test results.   lawrence Ramesh will need an apt to see Dr. Sabine Eaton in 6 months, thanks!!

## 2018-09-14 ENCOUNTER — TELEPHONE (OUTPATIENT)
Dept: CARDIOLOGY CLINIC | Age: 58
End: 2018-09-14

## 2018-09-14 NOTE — TELEPHONE ENCOUNTER
Verified patient with two identifiers. Spoke with pt, he is having some tightness in chest off and on, no more than he had previously.  will call to set up apt.

## 2018-09-14 NOTE — TELEPHONE ENCOUNTER
Per patient still having chest tightness/discomfort off & on. He is asking for any suggestions and did not schedule the 6 month follow up as suggested from notes. Thanks!

## 2018-09-17 ENCOUNTER — OFFICE VISIT (OUTPATIENT)
Dept: CARDIOLOGY CLINIC | Age: 58
End: 2018-09-17

## 2018-09-17 VITALS
HEIGHT: 69 IN | RESPIRATION RATE: 18 BRPM | BODY MASS INDEX: 28.13 KG/M2 | SYSTOLIC BLOOD PRESSURE: 118 MMHG | HEART RATE: 60 BPM | WEIGHT: 189.9 LBS | OXYGEN SATURATION: 97 % | DIASTOLIC BLOOD PRESSURE: 68 MMHG

## 2018-09-17 DIAGNOSIS — I20.9 ANGINA, CLASS III (HCC): Primary | ICD-10-CM

## 2018-09-17 DIAGNOSIS — R07.89 OTHER CHEST PAIN: ICD-10-CM

## 2018-09-17 RX ORDER — RANOLAZINE 500 MG/1
500 TABLET, EXTENDED RELEASE ORAL 2 TIMES DAILY
Qty: 28 TAB | Refills: 0 | Status: SHIPPED | COMMUNITY
Start: 2018-09-17 | End: 2018-10-12 | Stop reason: SDUPTHER

## 2018-09-17 NOTE — MR AVS SNAPSHOT
[address] [facility] 
[phone] Patient: Juana Dominguez MRN: [id number] AAT:[birth year] Visit Information Date & Time Provider Department Dept. Phone Encounter #  
 9/17/2018  9:00 AM [facility] [phone] [id number] Your Appointments 10/8/2018  3:00 PM  
ESTABLISHED PATIENT with Pillo Cain MD  
[facility] [address] [place] [address]) Appt Note: 2 week f/u 9/17/18 kb  
 [address]  
[phone] [address] Upcoming Health Maintenance Date Due Hepatitis C Screening 1960 DTaP/Tdap/Td series (1 - Tdap) 9/8/1980 FOBT Q 1 YEAR AGE 50-75 9/8/2009 Influenza Age 5 to Adult 8/1/2018 Allergies as of 9/17/2018  Review Complete On: 9/17/2018 By: Zoraida Tellez LPN No Known Allergies Current Immunizations  Never Reviewed No immunizations on file. Not reviewed this visit You Were Diagnosed With   
  
 Codes Comments [facility]    -  Primary ICD-10-CM: I20.9 ICD-9-CM: 413.9 Other chest pain     ICD-10-CM: R07.89 ICD-9-CM: 786.59 Vitals BP Pulse Resp Height(growth percentile) Weight(growth percentile) SpO2  
 118/68 (BP 1 Location: Right arm, BP Patient Position: Sitting) 60 18 5' 9\" (1.753 m) 189 lb 14.4 oz (86.1 kg) 97% BMI Smoking Status 28.04 kg/m2 Never Smoker Vitals History BMI and BSA Data Body Mass Index Body Surface Area 28.04 kg/m 2 2.05 m 2 Your Updated Medication List  
  
   
This list is accurate as of 9/17/18  9:56 AM.  Always use your most recent med list.  
  
  
  
  
 aspirin delayed-release 81 mg tablet Take 1 Tab by mouth daily. metoprolol succinate 25 mg XL tablet Commonly known as:  TOPROL-XL Take 1 Tab by mouth nightly. nitroglycerin 0.4 mg SL tablet Commonly known as:  NITROSTAT  
1 Tab by SubLINGual route every five (5) minutes as needed for Chest Pain. Up to 3 doses. ranolazine  mg SR tablet Commonly known as:  RANEXA Take 1 Tab by mouth two (2) times a day. We Performed the Following AMB POC EKG ROUTINE W/ 12 LEADS, INTER & REP [53300 CPT(R)] CBC WITH AUTOMATED DIFF [85385 CPT(R)] CK [id number] CPT(R)] LIPID PANEL [41583 CPT(R)] METABOLIC PANEL, COMPREHENSIVE [30678 CPT(R)] To-Do List   
 09/18/2018 Cardiac Services:  CARDIAC CATHETERIZATION Referral Information Referral ID Referred By Referred To  
  
 [id number] Pablo Abdiaziz QUIROZ Not Available Visits Status Start Date End Date 1 New Request 9/17/18 9/17/19 If your referral has a status of pending review or denied, additional information will be sent to support the outcome of this decision. Introducing [facility] & HEALTH SERVICES! Yonas Clemente introduces [employer] patient portal. Now you can access parts of your medical record, email your doctor's office, and request medication refills online. 1. In your internet browser, go to [email or web address]://[email or web address]. [employer] 2. Click on the First Time User? Click Here link in the Sign In box. You will see the New Member Sign Up page. 3. Enter your [employer] Access Code exactly as it appears below. You will not need to use this code after youve completed the sign-up process. If you do not sign up before the expiration date, you must request a new code. · [employer] Access Code: [email or web address] Expires: 11/4/2018  3:15 PM 
 
4. Enter the last four digits of your Social Security Number (xxxx) and Date of Birth (mm/dd/yyyy) as indicated and click Submit. You will be taken to the next sign-up page. 5. Create a [employer] ID. This will be your [employer] login ID and cannot be changed, so think of one that is secure and easy to remember. 6. Create a [employer] password. You can change your password at any time. 7. Enter your Password Reset Question and Answer. This can be used at a later time if you forget your password. 8. Enter your e-mail address. You will receive e-mail notification when new information is available in [address] 9. Click Sign Up. You can now view and download portions of your medical record. 10. Click the Download Summary menu link to download a portable copy of your medical information. If you have questions, please visit the Frequently Asked Questions section of the [employer] website. Remember, [employer] is NOT to be used for urgent needs. For medical emergencies, dial 911. Now available from your iPhone and Android! Please provide this summary of care documentation to your next provider. Your primary care clinician is listed as NONE. If you have any questions after today's visit, please call [phone].

## 2018-09-17 NOTE — PROGRESS NOTES
1. Have you been to the ER, urgent care clinic since your last visit? Hospitalized since your last visit? NO    2. Have you seen or consulted any other health care providers outside of the [facility] since your last visit? Include any pap smears or colon screening. NO    C/O TIGHTNESS IN CHEST OFF AND ON.

## 2018-09-17 NOTE — PROGRESS NOTES
Carson Upton DNP, ANP-BC  Subjective/HPI:     Aleks Champion is a 60 y.o. male is here for test results follow-up. After initial consult stress echo was split with positive EKG changes however no wall motion anomalies on echo imaging, nuclear stress test performed showing no ischemia. Patient reports for 1 week after using metoprolol he felt \"great\" and then his symptoms returned of anterior chest pain/pressure with minimal activities with radiation to left arm and associated dyspnea. He reports in the last few weeks he has not been able to do his typical routine activities due to symptoms. Most recent episode of chest tightness was with walking in his office. No past medical history on file. Past Surgical History:   Procedure Laterality Date    HX CHOLECYSTECTOMY       No Known Allergies   Family History   Problem Relation Age of Onset    Stroke Mother     Coronary Artery Disease Father       Current Outpatient Prescriptions   Medication Sig    ranolazine ER (RANEXA) 500 mg SR tablet Take 1 Tab by mouth two (2) times a day.  metoprolol succinate (TOPROL-XL) 25 mg XL tablet Take 1 Tab by mouth nightly.  aspirin delayed-release 81 mg tablet Take 1 Tab by mouth daily.  nitroglycerin (NITROSTAT) 0.4 mg SL tablet 1 Tab by SubLINGual route every five (5) minutes as needed for Chest Pain. Up to 3 doses. No current facility-administered medications for this visit. Vitals:    09/17/18 0858 09/17/18 0903   BP: 120/70 118/68   Pulse: 60    Resp: 18    SpO2: 97%    Weight: 189 lb 14.4 oz (86.1 kg)    Height: 5' 9\" (1.753 m)      Social History     Social History    Marital status: UNKNOWN     Spouse name: N/A    Number of children: N/A    Years of education: N/A     Occupational History    Not on file.      Social History Main Topics    Smoking status: Never Smoker    Smokeless tobacco: Never Used    Alcohol use No    Drug use: No    Sexual activity: Not on file Other Topics Concern    Not on file     Social History Narrative       I have reviewed the nurses notes, vitals, problem list, allergy list, medical history, family, social history and medications. Review of Symptoms:    General: Pt denies excessive weight gain or loss. Pt is able to conduct ADL's  HEENT: Denies blurred vision, headaches, epistaxis and difficulty swallowing. Respiratory: Denies shortness of breath, + BLANCO, wheezing or stridor. Cardiovascular: + Exertional chest pain, denies palpitations, edema or PND  Gastrointestinal: Denies poor appetite, indigestion, abdominal pain or blood in stool  Musculoskeletal: Denies pain or swelling from muscles or joints  Neurologic: Denies tremor, paresthesias, or sensory motor disturbance  Skin: Denies rash, itching or texture change. Physical Exam:      General: Well developed, in no acute distress, cooperative and alert  HEENT: No carotid bruits, no JVD, trach is midline. Neck Supple, PEERL, EOM intact. Heart:  Normal S1/S2 negative S3 or S4. Regular, no murmur, gallop or rub.   Respiratory: Clear bilaterally x 4, no wheezing or rales  Abdomen:   Soft, non-tender, no masses, bowel sounds are active.   Extremities:  No edema, normal cap refill, no cyanosis, atraumatic. Neuro: A&Ox3, speech clear, gait stable. Skin: Skin color is normal. No rashes or lesions.  Non diaphoretic  Vascular: 2+ pulses symmetric in all extremities    Cardiographics    ECG: Sinus rhythm  Results for orders placed or performed during the hospital encounter of 03/28/17   EKG, 12 LEAD, INITIAL   Result Value Ref Range    Ventricular Rate 76 BPM    Atrial Rate 76 BPM    P-R Interval 122 ms    QRS Duration 96 ms    Q-T Interval 368 ms    QTC Calculation (Bezet) 414 ms    Calculated P Axis 35 degrees    Calculated R Axis 55 degrees    Calculated T Axis 64 degrees    Diagnosis       Sinus rhythm with premature atrial complexes  Confirmed by Koby Gonzalez ([id number]) on 3/28/2017 9:58:38 PM           Cardiology Labs:  Lab Results   Component Value Date/Time    Cholesterol, total 135 08/07/2018 04:47 PM    HDL Cholesterol 33 (L) 08/07/2018 04:47 PM    LDL, calculated 72 08/07/2018 04:47 PM    Triglyceride 151 (H) 08/07/2018 04:47 PM       Lab Results   Component Value Date/Time    Sodium 142 08/07/2018 04:47 PM    Potassium 4.6 08/07/2018 04:47 PM    Chloride 102 08/07/2018 04:47 PM    CO2 24 08/07/2018 04:47 PM    Anion gap 10 03/28/2017 01:54 PM    Glucose 87 08/07/2018 04:47 PM    BUN 15 08/07/2018 04:47 PM    Creatinine 1.25 08/07/2018 04:47 PM    BUN/Creatinine ratio 12 08/07/2018 04:47 PM    GFR est AA 73 08/07/2018 04:47 PM    GFR est non-AA 63 08/07/2018 04:47 PM    Calcium 9.2 08/07/2018 04:47 PM    Bilirubin, total 0.3 08/07/2018 04:47 PM    AST (SGOT) 32 08/07/2018 04:47 PM    Alk. phosphatase 62 08/07/2018 04:47 PM    Protein, total 7.2 08/07/2018 04:47 PM    Albumin 4.5 08/07/2018 04:47 PM    Globulin 3.5 03/28/2017 01:54 PM    A-G Ratio 1.7 08/07/2018 04:47 PM    ALT (SGPT) 58 (H) 08/07/2018 04:47 PM           Assessment:     Assessment:     Diagnoses and all orders for this visit:    1. Angina, class III (HCC)  -     METABOLIC PANEL, COMPREHENSIVE  -     CBC WITH AUTOMATED DIFF  -     LIPID PANEL  -     CK  -     CARDIAC CATHETERIZATION; Future    2. Other chest pain  -     AMB POC EKG ROUTINE W/ 12 LEADS, INTER & REP  -     METABOLIC PANEL, COMPREHENSIVE  -     CBC WITH AUTOMATED DIFF  -     LIPID PANEL  -     CK  -     CARDIAC CATHETERIZATION; Future    Other orders  -     ranolazine ER (RANEXA) 500 mg SR tablet; Take 1 Tab by mouth two (2) times a day. ICD-10-CM ICD-9-CM    1. Angina, class III (HCC) F66.6 041.4 METABOLIC PANEL, COMPREHENSIVE      CBC WITH AUTOMATED DIFF      LIPID PANEL      CK      CARDIAC CATHETERIZATION   2.  Other chest pain R07.89 786.59 AMB POC EKG ROUTINE W/ 12 LEADS, INTER & REP      METABOLIC PANEL, COMPREHENSIVE      CBC WITH AUTOMATED DIFF LIPID PANEL      CK      CARDIAC CATHETERIZATION     Orders Placed This Encounter    METABOLIC PANEL, COMPREHENSIVE    CBC WITH AUTOMATED DIFF    LIPID PANEL    CK    CARDIAC CATHETERIZATION     Standing Status:   Future     Standing Expiration Date:   3/17/2019     Order Specific Question:   Reason for Exam:     Answer:   [facility] CP FC 3    AMB POC EKG ROUTINE W/ 12 LEADS, INTER & REP     Order Specific Question:   Reason for Exam:     Answer:   routine    ranolazine ER (RANEXA) 500 mg SR tablet     Sig: Take 1 Tab by mouth two (2) times a day. Dispense:  28 Tab     Refill:  0        Plan:     Patient is a 65-year-old male with waxing and waning episodes of anterior chest pain/pressure with radiation to the left arm with associated generalized fatigue weakness and dyspnea on exertion. Symptoms did improve after initiation of beta-blocker however they have returned, stress echo had a positive EKG response however echo imaging and nuclear imaging were negative for ischemia. His symptoms are not related to exertion; somewhat atypical.  We will add second antianginal Ranexa 500 mg twice a day and reevaluate in a few weeks. Kandis Sultana MD    This note was created using voice recognition software. Despite editing, there may be syntax errors.

## 2018-10-01 ENCOUNTER — TELEPHONE (OUTPATIENT)
Dept: CARDIOLOGY CLINIC | Age: 58
End: 2018-10-01

## 2018-10-01 NOTE — TELEPHONE ENCOUNTER
Pt has run out of samples of ranexa and doesn't f/u with Dr. Bony Sam until next week. He wants to know if he can come  more samples to carry him until his appt.

## 2018-10-08 ENCOUNTER — OFFICE VISIT (OUTPATIENT)
Dept: CARDIOLOGY CLINIC | Age: 58
End: 2018-10-08

## 2018-10-08 VITALS
BODY MASS INDEX: 27.77 KG/M2 | HEART RATE: 83 BPM | SYSTOLIC BLOOD PRESSURE: 132 MMHG | OXYGEN SATURATION: 96 % | WEIGHT: 187.5 LBS | RESPIRATION RATE: 18 BRPM | HEIGHT: 69 IN | DIASTOLIC BLOOD PRESSURE: 84 MMHG

## 2018-10-08 DIAGNOSIS — R94.39 ABNORMAL STRESS ECHO: ICD-10-CM

## 2018-10-08 DIAGNOSIS — I10 ESSENTIAL HYPERTENSION: ICD-10-CM

## 2018-10-08 DIAGNOSIS — R07.89 ATYPICAL CHEST PAIN: Primary | ICD-10-CM

## 2018-10-08 RX ORDER — METOPROLOL SUCCINATE 25 MG/1
25 TABLET, EXTENDED RELEASE ORAL
Qty: 90 TAB | Refills: 3 | Status: SHIPPED | OUTPATIENT
Start: 2018-10-08 | End: 2018-11-07 | Stop reason: SDUPTHER

## 2018-10-08 RX ORDER — METOPROLOL SUCCINATE 25 MG/1
25 TABLET, EXTENDED RELEASE ORAL
Qty: 30 TAB | Refills: 6 | Status: SHIPPED | OUTPATIENT
Start: 2018-10-08 | End: 2018-10-08 | Stop reason: SDUPTHER

## 2018-10-08 NOTE — PROGRESS NOTES
[address] [facility], [address] [facility]  [phone]     Subjective:      Darline Noonan is a 60 y.o. male is here for routine f/u. Reports improved cp. Denies shortness of breath, orthopnea, PND, LE edema, palpitations, syncope, or presyncope. Doing well. Patient Active Problem List    Diagnosis Date Noted    Other chest pain 08/06/2018      None  No past medical history on file. Past Surgical History:   Procedure Laterality Date    HX CHOLECYSTECTOMY       No Known Allergies   Family History   Problem Relation Age of Onset    Stroke Mother     Coronary Artery Disease Father       Social History     Social History    Marital status: UNKNOWN     Spouse name: N/A    Number of children: N/A    Years of education: N/A     Occupational History    Not on file. Social History Main Topics    Smoking status: Never Smoker    Smokeless tobacco: Never Used    Alcohol use No    Drug use: No    Sexual activity: Not on file     Other Topics Concern    Not on file     Social History Narrative      Current Outpatient Prescriptions   Medication Sig    metoprolol succinate (TOPROL-XL) 25 mg XL tablet Take 1 Tab by mouth nightly.  ranolazine ER (RANEXA) 500 mg SR tablet Take 1 Tab by mouth two (2) times a day.  aspirin delayed-release 81 mg tablet Take 1 Tab by mouth daily.  nitroglycerin (NITROSTAT) 0.4 mg SL tablet 1 Tab by SubLINGual route every five (5) minutes as needed for Chest Pain. Up to 3 doses. No current facility-administered medications for this visit. Review of Symptoms:  11 systems reviewed, negative other than as stated in the HPI    Physical ExamPhysical Exam:    Vitals:    10/08/18 1458 10/08/18 1504   BP: 130/84 132/84   Pulse: 83    Resp: 18    SpO2: 96%    Weight: 187 lb 8 oz (85 kg)    Height: 5' 9\" (1.753 m)      Body mass index is 27.69 kg/(m^2). General PE   Gen:  NAD  Mental Status - Alert.  General Appearance - Not in acute distress. Chest and Lung Exam   Inspection: Accessory muscles - No use of accessory muscles in breathing. Auscultation:   Breath sounds: - Normal.   Cardiovascular   Inspection: Jugular vein - Bilateral - Inspection Normal.   Palpation/Percussion:   Apical Impulse: - Normal.   Auscultation: Rhythm - Regular. Heart Sounds - S1 WNL and S2 WNL. No S3 or S4. Murmurs & Other Heart Sounds: Auscultation of the heart reveals - No Murmurs. Peripheral Vascular   Upper Extremity: Inspection - Bilateral - No Cyanotic nailbeds or Digital clubbing. Lower Extremity:   Palpation: Edema - Bilateral - No edema. Abdomen:   Soft, non-tender, bowel sounds are active. Neuro: A&O times 3, CN and motor grossly WNL    Labs:   Lab Results   Component Value Date/Time    Cholesterol, total 135 08/07/2018 04:47 PM    HDL Cholesterol 33 (L) 08/07/2018 04:47 PM    LDL, calculated 72 08/07/2018 04:47 PM    Triglyceride 151 (H) 08/07/2018 04:47 PM     No results found for: CPK, CPKX, CPX  Lab Results   Component Value Date/Time    Sodium 142 08/07/2018 04:47 PM    Potassium 4.6 08/07/2018 04:47 PM    Chloride 102 08/07/2018 04:47 PM    CO2 24 08/07/2018 04:47 PM    Anion gap 10 03/28/2017 01:54 PM    Glucose 87 08/07/2018 04:47 PM    BUN 15 08/07/2018 04:47 PM    Creatinine 1.25 08/07/2018 04:47 PM    BUN/Creatinine ratio 12 08/07/2018 04:47 PM    GFR est AA 73 08/07/2018 04:47 PM    GFR est non-AA 63 08/07/2018 04:47 PM    Calcium 9.2 08/07/2018 04:47 PM    Bilirubin, total 0.3 08/07/2018 04:47 PM    AST (SGOT) 32 08/07/2018 04:47 PM    Alk. phosphatase 62 08/07/2018 04:47 PM    Protein, total 7.2 08/07/2018 04:47 PM    Albumin 4.5 08/07/2018 04:47 PM    Globulin 3.5 03/28/2017 01:54 PM    A-G Ratio 1.7 08/07/2018 04:47 PM    ALT (SGPT) 58 (H) 08/07/2018 04:47 PM       EKG:  n/a     Assessment:     Assessment:      1. Atypical chest pain    2. Essential hypertension    3.  Abnormal stress echo        Orders Placed This Encounter    DISCONTD: metoprolol succinate (TOPROL-XL) 25 mg XL tablet     Sig: Take 1 Tab by mouth nightly. Dispense:  30 Tab     Refill:  6    metoprolol succinate (TOPROL-XL) 25 mg XL tablet     Sig: Take 1 Tab by mouth nightly. Dispense:  90 Tab     Refill:  3        Plan:     Patient presents doing well and stable from cardiac standpoint. Hx atypical cp  Stress echo 8/18 had a positive EKG response however echo imaging and nuclear imaging 9/18 were negative for ischemia. Today he reports improved episode of cp since addition of Ranexa. BP normotensive. Continue ASA, BB, and Ranexa. 8/18 LDL 72. Not on statin. Continue current care and f/u in 6 months. Pt. Seen and examined and discussed with NP. Agree with NP note above. Atypical CP with equivocal to negative noninvasive w/u and good response to medical management.   F/U 6 mo    Zehra Richardson MD

## 2018-10-08 NOTE — PROGRESS NOTES
1. Have you been to the ER, urgent care clinic since your last visit? Hospitalized since your last visit? NO    2. Have you seen or consulted any other health care providers outside of the [address] [place] [address] since your last visit? Include any pap smears or colon screening. NO    2 WEEK FOLLOW UP,  NO CARDIAC C/O.

## 2018-10-08 NOTE — MR AVS SNAPSHOT
[address] [facility] 
[phone] Patient: Sadia Manzanares MRN: [id number] ZBO:[id number] Visit Information Date & Time Provider Department Dept. Phone Encounter #  
 10/8/2018  3:00 PM Lb Hooper [place] [phone] [id number] Your Appointments 4/17/2019  9:00 AM  
ESTABLISHED PATIENT with Lb Hooper MD  
[facility] Appt Note: Per Dr. Casandra Daley, 6 mo f/u  
 [address] [facility]  
[phone] [address] [facility] Upcoming Health Maintenance Date Due Hepatitis C Screening 1960 DTaP/Tdap/Td series (1 - Tdap) 9/8/1980 Shingrix Vaccine Age 50> (1 of 2) 9/8/2009 FOBT Q 1 YEAR AGE 50-75 9/8/2009 Influenza Age 5 to Adult 8/1/2018 Allergies as of 10/8/2018  Review Complete On: 10/8/2018 By: Lb Hooper MD  
 No Known Allergies Current Immunizations  Never Reviewed No immunizations on file. Not reviewed this visit You Were Diagnosed With   
  
 Codes Comments Atypical chest pain    -  Primary ICD-10-CM: R07.89 ICD-9-CM: 786.59 Essential hypertension     ICD-10-CM: I10 
ICD-9-CM: 401.9 Abnormal stress echo     ICD-10-CM: R94.39 
ICD-9-CM: 794.39 Vitals BP Pulse Resp Height(growth percentile) Weight(growth percentile) SpO2  
 132/84 (BP 1 Location: Right arm, BP Patient Position: Sitting) 83 18 5' 9\" (1.753 m) 187 lb 8 oz (85 kg) 96% BMI Smoking Status 27.69 kg/m2 Never Smoker Vitals History BMI and BSA Data Body Mass Index Body Surface Area  
 27.69 kg/m 2 2.03 m 2 Preferred Pharmacy Pharmacy Name Phone [facility] [place] [address], [address] [facility] [phone] Your Updated Medication List  
  
   
 This list is accurate as of 10/8/18  3:35 PM.  Always use your most recent med list.  
  
  
  
  
 aspirin delayed-release 81 mg tablet Take 1 Tab by mouth daily. metoprolol succinate 25 mg XL tablet Commonly known as:  TOPROL-XL Take 1 Tab by mouth nightly. nitroglycerin 0.4 mg SL tablet Commonly known as:  NITROSTAT  
1 Tab by SubLINGual route every five (5) minutes as needed for Chest Pain. Up to 3 doses. ranolazine  mg SR tablet Commonly known as:  RANEXA Take 1 Tab by mouth two (2) times a day. Prescriptions Sent to Pharmacy Refills  
 metoprolol succinate (TOPROL-XL) 25 mg XL tablet 3 Sig: Take 1 Tab by mouth nightly. Class: Normal  
 Pharmacy: [address] [facility], [address] [facility] #: [phone] Route: Oral  
  
Introducing [facility]! [facility] introduces Drop Messages patient portal. Now you can access parts of your medical record, email your doctor's office, and request medication refills online. 1. In your internet browser, go to [email or web address] 2. Click on the First Time User? Click Here link in the Sign In box. You will see the New Member Sign Up page. 3. Enter your Drop Messages Access Code exactly as it appears below. You will not need to use this code after youve completed the sign-up process. If you do not sign up before the expiration date, you must request a new code. · Drop Messages Access Code: [email or web address] Expires: 11/4/2018  3:15 PM 
 
4. Enter the last four digits of your Social Security Number (xxxx) and Date of Birth (mm/dd/yyyy) as indicated and click Submit. You will be taken to the next sign-up page. 5. Create a [employer] ID. This will be your Drop Messages login ID and cannot be changed, so think of one that is secure and easy to remember. 6. Create a [employer] password. You can change your password at any time. 7. Enter your Password Reset Question and Answer. This can be used at a later time if you forget your password. 8. Enter your e-mail address. You will receive e-mail notification when new information is available in [address] 9. Click Sign Up. You can now view and download portions of your medical record. 10. Click the Download Summary menu link to download a portable copy of your medical information. If you have questions, please visit the Frequently Asked Questions section of the [employer] website. Remember, [employer] is NOT to be used for urgent needs. For medical emergencies, dial 911. Now available from your iPhone and Android! Please provide this summary of care documentation to your next provider. Your primary care clinician is listed as NONE. If you have any questions after today's visit, please call [phone].

## 2018-10-12 RX ORDER — RANOLAZINE 500 MG/1
500 TABLET, EXTENDED RELEASE ORAL 2 TIMES DAILY
Qty: 56 TAB | Refills: 0 | Status: SHIPPED | COMMUNITY
Start: 2018-10-12 | End: 2018-11-20 | Stop reason: SDUPTHER

## 2018-11-07 ENCOUNTER — TELEPHONE (OUTPATIENT)
Dept: CARDIOLOGY CLINIC | Age: 58
End: 2018-11-07

## 2018-11-07 RX ORDER — METOPROLOL SUCCINATE 25 MG/1
25 TABLET, EXTENDED RELEASE ORAL
Qty: 90 TAB | Refills: 1 | Status: SHIPPED | OUTPATIENT
Start: 2018-11-07 | End: 2018-11-20 | Stop reason: SDUPTHER

## 2018-11-07 NOTE — TELEPHONE ENCOUNTER
Pt would like a 90 supply of Metoprolol succinate 25mg instead of the 30 day supply that he already has for insurance purposes. He would like it sent to [address] [facility] in [place] [address].      thanks

## 2018-11-20 RX ORDER — METOPROLOL SUCCINATE 25 MG/1
25 TABLET, EXTENDED RELEASE ORAL
Qty: 90 TAB | Refills: 1 | Status: SHIPPED | OUTPATIENT
Start: 2018-11-20 | End: 2019-05-12 | Stop reason: SDUPTHER

## 2018-11-20 RX ORDER — RANOLAZINE 500 MG/1
500 TABLET, EXTENDED RELEASE ORAL 2 TIMES DAILY
Qty: 180 TAB | Refills: 1 | Status: SHIPPED | OUTPATIENT
Start: 2018-11-20 | End: 2019-05-12 | Stop reason: SDUPTHER

## 2019-04-17 ENCOUNTER — OFFICE VISIT (OUTPATIENT)
Dept: CARDIOLOGY CLINIC | Age: 59
End: 2019-04-17

## 2019-04-17 VITALS
WEIGHT: 186.6 LBS | DIASTOLIC BLOOD PRESSURE: 78 MMHG | RESPIRATION RATE: 18 BRPM | OXYGEN SATURATION: 97 % | BODY MASS INDEX: 27.64 KG/M2 | HEIGHT: 69 IN | HEART RATE: 56 BPM | SYSTOLIC BLOOD PRESSURE: 140 MMHG

## 2019-04-17 DIAGNOSIS — R07.89 OTHER CHEST PAIN: Primary | ICD-10-CM

## 2019-04-17 NOTE — PROGRESS NOTES
1. Have you been to the ER, urgent care clinic since your last visit? Hospitalized since your last visit? No    2. Have you seen or consulted any other health care providers outside of the [address] [place] [address] [place] since your last visit? Include any pap smears or colon screening. No    Chief Complaint   Patient presents with    Chest Pain     6 mo appt. C/O CP while driving.

## 2019-04-17 NOTE — PROGRESS NOTES
Marcelino Arzola, KATELYNP-BC    Subjective/HPI:     Mr. Pat Onofre is a 60 y.o. male is here for routine f/u. He has a PMHx of atypical chest pain symptoms. He is doing well. He reports chest pain symptoms that occur only if he wakes up early morning, around 4AM.  They last a few minutes and resolve and he has no recurrence the rest of the day. He has no symptoms with activity. He denies shortness of breath, orthopnea, PND or edema. He denies dizziness or palpitations. PCP Provider  None  History reviewed. No pertinent past medical history.    Past Surgical History:   Procedure Laterality Date    HX CHOLECYSTECTOMY       Family History   Problem Relation Age of Onset    Stroke Mother     Coronary Artery Disease Father      Social History     Socioeconomic History    Marital status: UNKNOWN     Spouse name: Not on file    Number of children: Not on file    Years of education: Not on file    Highest education level: Not on file   Occupational History    Not on file   Social Needs    Financial resource strain: Not on file    Food insecurity:     Worry: Not on file     Inability: Not on file    Transportation needs:     Medical: Not on file     Non-medical: Not on file   Tobacco Use    Smoking status: Never Smoker    Smokeless tobacco: Never Used   Substance and Sexual Activity    Alcohol use: No    Drug use: No    Sexual activity: Not on file   Lifestyle    Physical activity:     Days per week: Not on file     Minutes per session: Not on file    Stress: Not on file   Relationships    Social connections:     Talks on phone: Not on file     Gets together: Not on file     Attends Anabaptism service: Not on file     Active member of club or organization: Not on file     Attends meetings of clubs or organizations: Not on file     Relationship status: Not on file    Intimate partner violence:     Fear of current or ex partner: Not on file     Emotionally abused: Not on file Physically abused: Not on file     Forced sexual activity: Not on file   Other Topics Concern    Not on file   Social History Narrative    Not on file       No Known Allergies     Current Outpatient Medications   Medication Sig    ranolazine ER (RANEXA) 500 mg SR tablet Take 1 Tab by mouth two (2) times a day.  metoprolol succinate (TOPROL-XL) 25 mg XL tablet Take 1 Tab by mouth nightly.  aspirin delayed-release 81 mg tablet Take 1 Tab by mouth daily.  nitroglycerin (NITROSTAT) 0.4 mg SL tablet 1 Tab by SubLINGual route every five (5) minutes as needed for Chest Pain. Up to 3 doses. No current facility-administered medications for this visit. I have reviewed the problem list, allergy list, medical history, family, social history and medications. Review of Symptoms:    Review of Systems   Constitutional: Negative for chills, fever and weight loss. HENT: Negative for nosebleeds. Eyes: Negative for blurred vision and double vision. Respiratory: Negative for cough, shortness of breath and wheezing. Cardiovascular: Negative for chest pain, palpitations, orthopnea, leg swelling and PND. Gastrointestinal: Negative for abdominal pain, blood in stool, diarrhea, nausea and vomiting. Musculoskeletal: Negative for joint pain. Skin: Negative for rash. Neurological: Negative for dizziness, tingling and loss of consciousness. Endo/Heme/Allergies: Does not bruise/bleed easily. Physical Exam:      General: Well developed, in no acute distress, cooperative and alert  HEENT: No carotid bruits, no JVD, trach is midline. Neck Supple, PEERL, EOM intact. Heart:  reg rate and rhythm; normal S1/S2; no murmurs, gallops or rubs. Respiratory: Clear bilaterally x 4, no wheezing or rales  Abdomen:   Soft, non-tender, no distention, no masses. + BS. Extremities:  Normal cap refill, no cyanosis, atraumatic. No edema. Neuro: A&Ox3, speech clear, gait stable.    Skin: Skin color is normal. No rashes or lesions. Non diaphoretic  Vascular: 2+ pulses symmetric in all extremities    Vitals:    04/17/19 0858 04/17/19 0905   BP: 132/80 140/78   Pulse: (!) 56    Resp: 18    SpO2: 97%    Weight: 186 lb 9.6 oz (84.6 kg)    Height: 5' 9\" (1.753 m)        Cardiographics    ECG: sinus bradycardia  Results for orders placed or performed during the hospital encounter of 03/28/17   EKG, 12 LEAD, INITIAL   Result Value Ref Range    Ventricular Rate 76 BPM    Atrial Rate 76 BPM    P-R Interval 122 ms    QRS Duration 96 ms    Q-T Interval 368 ms    QTC Calculation (Bezet) 414 ms    Calculated P Axis 35 degrees    Calculated R Axis 55 degrees    Calculated T Axis 64 degrees    Diagnosis       Sinus rhythm with premature atrial complexes  Confirmed by Raciel Crystal ([id number]) on 3/28/2017 9:58:38 PM         Cardiology Labs:  Lab Results   Component Value Date/Time    Cholesterol, total 135 08/07/2018 04:47 PM    HDL Cholesterol 33 (L) 08/07/2018 04:47 PM    LDL, calculated 72 08/07/2018 04:47 PM    Triglyceride 151 (H) 08/07/2018 04:47 PM       Lab Results   Component Value Date/Time    Sodium 142 08/07/2018 04:47 PM    Potassium 4.6 08/07/2018 04:47 PM    Chloride 102 08/07/2018 04:47 PM    CO2 24 08/07/2018 04:47 PM    Anion gap 10 03/28/2017 01:54 PM    Glucose 87 08/07/2018 04:47 PM    BUN 15 08/07/2018 04:47 PM    Creatinine 1.25 08/07/2018 04:47 PM    BUN/Creatinine ratio 12 08/07/2018 04:47 PM    GFR est AA 73 08/07/2018 04:47 PM    GFR est non-AA 63 08/07/2018 04:47 PM    Calcium 9.2 08/07/2018 04:47 PM    Bilirubin, total 0.3 08/07/2018 04:47 PM    AST (SGOT) 32 08/07/2018 04:47 PM    Alk. phosphatase 62 08/07/2018 04:47 PM    Protein, total 7.2 08/07/2018 04:47 PM    Albumin 4.5 08/07/2018 04:47 PM    Globulin 3.5 03/28/2017 01:54 PM    A-G Ratio 1.7 08/07/2018 04:47 PM    ALT (SGPT) 58 (H) 08/07/2018 04:47 PM           Assessment:     Assessment:       ICD-10-CM ICD-9-CM    1.  Other chest pain R07.89 786.59 AMB POC EKG ROUTINE W/ 12 LEADS, INTER & REP        Plan:     1. Other chest pain  Atypical symptoms; both Nuclear treadmill and Stress echocardiogram had ST-T changes consistent with ischemia, but normal imaging studies, with preserved ejection fraction. Has been doing well with present medical regimen. Will continue Toprol and Ranexa.   If symptoms worsen, could consider increasing Ranexa or addition of Amlodipine.  - AMB POC EKG ROUTINE W/ 12 LEADS, INTER & REP        Amber Schmitt MD

## 2019-05-13 RX ORDER — METOPROLOL SUCCINATE 25 MG/1
TABLET, EXTENDED RELEASE ORAL
Qty: 90 TAB | Refills: 1 | Status: SHIPPED | OUTPATIENT
Start: 2019-05-13 | End: 2019-11-10 | Stop reason: SDUPTHER

## 2019-05-13 RX ORDER — RANOLAZINE 500 MG/1
TABLET, FILM COATED, EXTENDED RELEASE ORAL
Qty: 180 TAB | Refills: 1 | Status: SHIPPED | OUTPATIENT
Start: 2019-05-13 | End: 2019-11-13 | Stop reason: SDUPTHER

## 2019-11-10 RX ORDER — METOPROLOL SUCCINATE 25 MG/1
TABLET, EXTENDED RELEASE ORAL
Qty: 90 TAB | Refills: 1 | Status: SHIPPED | OUTPATIENT
Start: 2019-11-10 | End: 2020-05-13

## 2019-11-13 RX ORDER — RANOLAZINE 500 MG/1
TABLET, FILM COATED, EXTENDED RELEASE ORAL
Qty: 180 TAB | Refills: 1 | Status: SHIPPED | OUTPATIENT
Start: 2019-11-13 | End: 2019-11-15 | Stop reason: DRUGHIGH

## 2019-11-15 ENCOUNTER — OFFICE VISIT (OUTPATIENT)
Dept: CARDIOLOGY CLINIC | Age: 59
End: 2019-11-15

## 2019-11-15 VITALS
RESPIRATION RATE: 18 BRPM | BODY MASS INDEX: 27.8 KG/M2 | HEIGHT: 69 IN | HEART RATE: 62 BPM | OXYGEN SATURATION: 98 % | DIASTOLIC BLOOD PRESSURE: 76 MMHG | SYSTOLIC BLOOD PRESSURE: 126 MMHG | WEIGHT: 187.7 LBS

## 2019-11-15 DIAGNOSIS — R06.09 DOE (DYSPNEA ON EXERTION): ICD-10-CM

## 2019-11-15 DIAGNOSIS — I10 ESSENTIAL HYPERTENSION: ICD-10-CM

## 2019-11-15 DIAGNOSIS — R07.9 CHEST PAIN, UNSPECIFIED TYPE: Primary | ICD-10-CM

## 2019-11-15 RX ORDER — RANOLAZINE 1000 MG/1
1000 TABLET, EXTENDED RELEASE ORAL 2 TIMES DAILY
Qty: 180 TAB | Refills: 0 | Status: SHIPPED | OUTPATIENT
Start: 2019-11-15 | End: 2020-03-25

## 2019-11-15 RX ORDER — NITROGLYCERIN 0.4 MG/1
0.4 TABLET SUBLINGUAL
Qty: 1 BOTTLE | Refills: 0 | Status: SHIPPED | OUTPATIENT
Start: 2019-11-15 | End: 2019-12-12 | Stop reason: SDUPTHER

## 2019-11-15 NOTE — PROGRESS NOTES
1. Have you been to the ER, urgent care clinic since your last visit? Hospitalized since your last visit? No    2. Have you seen or consulted any other health care providers outside of the [address] [place] [address] since your last visit? Include any pap smears or colon screening.  No    Chief Complaint   Patient presents with    Hypertension     6 mo f/u    Other     pt reports chest pressure a few times a week, nothing too bothersome

## 2019-11-15 NOTE — PROGRESS NOTES
Sunday Round DNP, ANP-BC  Subjective/HPI:     Nohelia Jean is a 60 y.o. male is here for routine f/u. This is a six-month follow-up. Patient previously seen for intermittent chest pressure and dyspnea last year stress echo had normal imaging but abnormal EKG response, did tie breaker nuclear stress test and that was negative for ischemia. He was medically treated with Ranexa and beta-blocker and had been doing well up into the last few months where he has noticed increasing chest pressure that can be triggered with exertional activity and has had limiting dyspnea and fatigue. Most notable when he was hunting a few weeks ago, he could only walk 1/4 mile before having to stop, he was hunting with people of his age who were able to outperform him. 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 PCP Provider  None  History reviewed. No pertinent past medical history. Past Surgical History:   Procedure Laterality Date    HX CHOLECYSTECTOMY       No Known Allergies   Family History   Problem Relation Age of Onset    Stroke Mother     Coronary Artery Disease Father       Current Outpatient Medications   Medication Sig    nitroglycerin (NITROSTAT) 0.4 mg SL tablet 1 Tab by SubLINGual route every five (5) minutes as needed for Chest Pain. Up to 3 doses.  ranolazine ER (RANEXA) 1,000 mg Take 1 Tab by mouth two (2) times a day.  metoprolol succinate (TOPROL-XL) 25 mg XL tablet TAKE 1 TABLET BY MOUTH EVERY DAY AT NIGHT    aspirin delayed-release 81 mg tablet Take 1 Tab by mouth daily. No current facility-administered medications for this visit.        Vitals:    11/15/19 0917 11/15/19 0930   BP: 120/72 126/76   Pulse: 62    Resp: 18    SpO2: 98%    Weight: 187 lb 11.2 oz (85.1 kg)    Height: 5' 9\" (1.753 m)      Social History Socioeconomic History    Marital status: SINGLE     Spouse name: Not on file    Number of children: Not on file    Years of education: Not on file    Highest education level: Not on file   Occupational History    Not on file   Social Needs    Financial resource strain: Not on file    Food insecurity:     Worry: Not on file     Inability: Not on file    Transportation needs:     Medical: Not on file     Non-medical: Not on file   Tobacco Use    Smoking status: Never Smoker    Smokeless tobacco: Never Used   Substance and Sexual Activity    Alcohol use: No    Drug use: No    Sexual activity: Not on file   Lifestyle    Physical activity:     Days per week: Not on file     Minutes per session: Not on file    Stress: Not on file   Relationships    Social connections:     Talks on phone: Not on file     Gets together: Not on file     Attends Baptism service: Not on file     Active member of club or organization: Not on file     Attends meetings of clubs or organizations: Not on file     Relationship status: Not on file    Intimate partner violence:     Fear of current or ex partner: Not on file     Emotionally abused: Not on file     Physically abused: Not on file     Forced sexual activity: Not on file   Other Topics Concern    Not on file   Social History Narrative    Not on file       I have reviewed the nurses notes, vitals, problem list, allergy list, medical history, family, social history and medications. Review of Symptoms:    General: Pt denies excessive weight gain or loss. Pt is able to conduct ADL's  HEENT: Denies blurred vision, headaches, epistaxis and difficulty swallowing. Respiratory: Denies shortness of breath, + BLANCO, denies wheezing or stridor.   Cardiovascular: + Chest discomfort, denies palpitations, edema or PND  Gastrointestinal: Denies poor appetite, indigestion, abdominal pain or blood in stool  Musculoskeletal: Denies pain or swelling from muscles or joints  Neurologic: Denies tremor, paresthesias, or sensory motor disturbance  Skin: Denies rash, itching or texture change. Physical Exam:      General: Well developed, in no acute distress, cooperative and alert  HEENT: No carotid bruits, no JVD, trach is midline. Neck Supple, PERRL, EOM intact. Heart:  Normal S1/S2 negative S3 or S4. Regular, no murmur, gallop or rub. Respiratory: Clear bilaterally x 4, no wheezing or rales  Abdomen:   Soft, non-tender, no masses, bowel sounds are active. Extremities:  No edema, normal cap refill, no cyanosis, atraumatic. Neuro: A&Ox3, speech clear, gait stable. Skin: Skin color is normal. No rashes or lesions.  Non diaphoretic  Vascular: 2+ pulses symmetric in all extremities    Cardiographics    ECG: Sinus rhythm  Results for orders placed or performed during the hospital encounter of 03/28/17   EKG, 12 LEAD, INITIAL   Result Value Ref Range    Ventricular Rate 76 BPM    Atrial Rate 76 BPM    P-R Interval 122 ms    QRS Duration 96 ms    Q-T Interval 368 ms    QTC Calculation (Bezet) 414 ms    Calculated P Axis 35 degrees    Calculated R Axis 55 degrees    Calculated T Axis 64 degrees    Diagnosis       Sinus rhythm with premature atrial complexes  Confirmed by Kirk Sauceda ([id number]) on 3/28/2017 9:58:38 PM           Cardiology Labs:  Lab Results   Component Value Date/Time    Cholesterol, total 135 08/07/2018 04:47 PM    HDL Cholesterol 33 (L) 08/07/2018 04:47 PM    LDL, calculated 72 08/07/2018 04:47 PM    Triglyceride 151 (H) 08/07/2018 04:47 PM       Lab Results   Component Value Date/Time    Sodium 142 08/07/2018 04:47 PM    Potassium 4.6 08/07/2018 04:47 PM    Chloride 102 08/07/2018 04:47 PM    CO2 24 08/07/2018 04:47 PM    Anion gap 10 03/28/2017 01:54 PM    Glucose 87 08/07/2018 04:47 PM    BUN 15 08/07/2018 04:47 PM    Creatinine 1.25 08/07/2018 04:47 PM    BUN/Creatinine ratio 12 08/07/2018 04:47 PM    GFR est AA 73 08/07/2018 04:47 PM    GFR est non-AA 63 2018 04:47 PM    Calcium 9.2 2018 04:47 PM    Bilirubin, total 0.3 2018 04:47 PM    AST (SGOT) 32 2018 04:47 PM    Alk. phosphatase 62 2018 04:47 PM    Protein, total 7.2 2018 04:47 PM    Albumin 4.5 2018 04:47 PM    Globulin 3.5 2017 01:54 PM    A-G Ratio 1.7 2018 04:47 PM    ALT (SGPT) 58 (H) 2018 04:47 PM           Assessment:     Assessment:     Diagnoses and all orders for this visit:    1. Chest pain, unspecified type  -     ECHO STRESS; Future    2. Essential hypertension  -     AMB POC EKG ROUTINE W/ 12 LEADS, INTER & REP  -     ECHO STRESS; Future    3. BLANCO (dyspnea on exertion)  -     ECHO STRESS; Future    Other orders  -     nitroglycerin (NITROSTAT) 0.4 mg SL tablet; 1 Tab by SubLINGual route every five (5) minutes as needed for Chest Pain. Up to 3 doses. -     ranolazine ER (RANEXA) 1,000 mg; Take 1 Tab by mouth two (2) times a day. ICD-10-CM ICD-9-CM    1. Chest pain, unspecified type R07.9 786.50 ECHO STRESS   2. Essential hypertension I10 401.9 AMB POC EKG ROUTINE W/ 12 LEADS, INTER & REP      ECHO STRESS   3. BLANCO (dyspnea on exertion) R06.09 786.09 ECHO STRESS     Orders Placed This Encounter    AMB POC EKG ROUTINE W/ 12 LEADS, INTER & REP     Order Specific Question:   Reason for Exam:     Answer:   Routine    nitroglycerin (NITROSTAT) 0.4 mg SL tablet     Si Tab by SubLINGual route every five (5) minutes as needed for Chest Pain. Up to 3 doses. Dispense:  1 Bottle     Refill:  0    ranolazine ER (RANEXA) 1,000 mg     Sig: Take 1 Tab by mouth two (2) times a day. Dispense:  180 Tab     Refill:  0     Dose increased 11/15/19        Plan:     Patient is a 76-year-old male who is presenting with progressive dyspnea on exertion at times limiting with intermittent left-sided chest pressure.   Last year's evaluation had a equivocal stress echo secondary to EKG changes \"tie breaker\" nuclear stress test was negative for ischemia. His symptoms at the time were resolved with medically treating for CAD with beta-blocker and Ranexa. He has recently developed progressive dyspnea on exertion at times limiting with associated chest pressure. His symptoms are consistent with angina functional class III, will uptitrate Ranexa 1000mg twice a day proceed with stress echo. Low threshold for cardiac catheterization. follow-up when testing complete  Merritt Portillo MD      Please note that this dictation was completed with MetaNotes, the computer voice recognition software. Quite often unanticipated grammatical, syntax, homophones, and other interpretive errors are inadvertently transcribed by the computer software. Please disregard these errors. Please excuse any errors that have escaped final proofreading. Thank you.

## 2019-12-12 RX ORDER — NITROGLYCERIN 0.4 MG/1
TABLET SUBLINGUAL
Qty: 25 TAB | Refills: 0 | Status: SHIPPED | OUTPATIENT
Start: 2019-12-12 | End: 2019-12-30 | Stop reason: SDUPTHER

## 2019-12-30 RX ORDER — NITROGLYCERIN 0.4 MG/1
TABLET SUBLINGUAL
Qty: 30 TAB | Refills: 2 | Status: SHIPPED | OUTPATIENT
Start: 2019-12-30 | End: 2022-08-15

## 2020-03-25 RX ORDER — RANOLAZINE 1000 MG/1
TABLET, EXTENDED RELEASE ORAL
Qty: 180 TAB | Refills: 0 | Status: SHIPPED | OUTPATIENT
Start: 2020-03-25 | End: 2020-06-24

## 2020-05-13 RX ORDER — METOPROLOL SUCCINATE 25 MG/1
TABLET, EXTENDED RELEASE ORAL
Qty: 90 TAB | Refills: 1 | Status: SHIPPED | OUTPATIENT
Start: 2020-05-13 | End: 2020-11-09

## 2020-11-09 RX ORDER — METOPROLOL SUCCINATE 25 MG/1
TABLET, EXTENDED RELEASE ORAL
Qty: 90 TAB | Refills: 0 | Status: SHIPPED | OUTPATIENT
Start: 2020-11-09 | End: 2021-02-02

## 2020-12-16 NOTE — PROGRESS NOTES
Ernesto Lee, [facility]-BC    Subjective/HPI:     Ben Armstrong is a 63 y.o. male is here for routine f/u. He has a PMHx of chest pain. To recall, patient previously had stress echo done which was equivocal -- imaging negative for ischemia, but abnormal EKG changes. He had lexiscans tress test subsequently which was negative for ischemia. Was doing well with medical management, but then developed symptoms again, so stress echo was repeated & Ranexa dose increased. Stress echo was normal, non-specific ST-T changes on EKG. Asymptomatic now         PCP Provider  None    History reviewed. No pertinent past medical history. No Known Allergies     Outpatient Encounter Medications as of 12/18/2020   Medication Sig Dispense Refill    metoprolol succinate (TOPROL-XL) 25 mg XL tablet TAKE 1 TABLET BY MOUTH EVERY DAY EVERY NIGHT 90 Tab 0    ranolazine ER (RANEXA) 1,000 mg TAKE 1 TABLET BY MOUTH TWICE A  Tab 3    nitroglycerin (NITROSTAT) 0.4 mg SL tablet TAKE 1 TAB BY SUBLINGUAL ROUTE EVERY FIVE (5) MINUTES AS NEEDED FOR CHEST PAIN. UP TO 3 DOSES. 30 Tab 2    aspirin delayed-release 81 mg tablet Take 1 Tab by mouth daily. 30 Tab 6     No facility-administered encounter medications on file as of 12/18/2020. Review of Symptoms:    Review of Systems   Constitutional: Negative for chills, fever and weight loss. HENT: Negative for nosebleeds. Eyes: Negative for blurred vision and double vision. Respiratory: Negative for cough, shortness of breath and wheezing. Cardiovascular: Negative for chest pain, palpitations, orthopnea, leg swelling and PND. Gastrointestinal: Negative for abdominal pain, blood in stool, diarrhea, nausea and vomiting. Musculoskeletal: Negative for joint pain. Skin: Negative for rash. Neurological: Negative for dizziness, tingling and loss of consciousness. Endo/Heme/Allergies: Does not bruise/bleed easily.        Physical Exam:      General: Well developed, in no acute distress, cooperative and alert  HEENT: No carotid bruits, no JVD, trach is midline. Neck Supple, PEERL, EOM intact. Heart:  reg rate and rhythm; normal S1/S2; no murmurs, no gallops or rubs. Respiratory: Clear bilaterally x 4, no wheezing or rales  Abdomen:   Soft, non-tender, no distention, no masses. + BS. Extremities:  Normal cap refill, no cyanosis, atraumatic. No edema. Neuro: A&Ox3, speech clear, gait stable. Skin: Skin color is normal. No rashes or lesions. Non diaphoretic  Vascular: 2+ pulses symmetric in all extremities    Vitals:    12/18/20 1502   BP: 118/72   Pulse: 65   Resp: 18   SpO2: 98%   Weight: 195 lb (88.5 kg)   Height: 5' 9\" (1.753 m)       ECG: NSR, normal    Cardiology Labs:    Lab Results   Component Value Date/Time    Cholesterol, total 135 08/07/2018 04:47 PM    HDL Cholesterol 33 (L) 08/07/2018 04:47 PM    LDL, calculated 72 08/07/2018 04:47 PM    VLDL, calculated 30 08/07/2018 04:47 PM       No results found for: HBA1C, HHG0IAEM, JUO6YFKR, UKN5ZRKG    Lab Results   Component Value Date/Time    Sodium 142 08/07/2018 04:47 PM    Potassium 4.6 08/07/2018 04:47 PM    Chloride 102 08/07/2018 04:47 PM    CO2 24 08/07/2018 04:47 PM    Glucose 87 08/07/2018 04:47 PM    BUN 15 08/07/2018 04:47 PM    Creatinine 1.25 08/07/2018 04:47 PM    BUN/Creatinine ratio 12 08/07/2018 04:47 PM    GFR est AA 73 08/07/2018 04:47 PM    GFR est non-AA 63 08/07/2018 04:47 PM    Calcium 9.2 08/07/2018 04:47 PM    Anion gap 10 03/28/2017 01:54 PM    Bilirubin, total 0.3 08/07/2018 04:47 PM    ALT (SGPT) 58 (H) 08/07/2018 04:47 PM    Alk. phosphatase 62 08/07/2018 04:47 PM    Protein, total 7.2 08/07/2018 04:47 PM    Albumin 4.5 08/07/2018 04:47 PM    Globulin 3.5 03/28/2017 01:54 PM    A-G Ratio 1.7 08/07/2018 04:47 PM          Assessment:       ICD-10-CM ICD-9-CM    1. Other chest pain  R07.89 786.59 AMB POC EKG ROUTINE W/ 12 LEADS, INTER & REP   2.  Essential hypertension  I10 401.9    3. BLANCO (dyspnea on exertion)  R06.00 786.09    4. Chest pain, unspecified type  R07.9 786.50         Plan:     1.  Other chest pain  Stress echo done 11/2019 without evidence of ischemia  On Toprol 25 mg, Ranexa 100 mg BID, and ASA  Continue medical management and asymptomatic    F/u with Dr. Honey Hernandez in 1 year  Alba Calle MD

## 2020-12-18 ENCOUNTER — OFFICE VISIT (OUTPATIENT)
Dept: CARDIOLOGY CLINIC | Age: 60
End: 2020-12-18
Payer: COMMERCIAL

## 2020-12-18 VITALS
SYSTOLIC BLOOD PRESSURE: 118 MMHG | RESPIRATION RATE: 18 BRPM | HEIGHT: 69 IN | WEIGHT: 195 LBS | OXYGEN SATURATION: 98 % | BODY MASS INDEX: 28.88 KG/M2 | DIASTOLIC BLOOD PRESSURE: 72 MMHG | HEART RATE: 65 BPM

## 2020-12-18 DIAGNOSIS — R06.09 DOE (DYSPNEA ON EXERTION): ICD-10-CM

## 2020-12-18 DIAGNOSIS — R07.9 CHEST PAIN, UNSPECIFIED TYPE: ICD-10-CM

## 2020-12-18 DIAGNOSIS — R07.89 OTHER CHEST PAIN: Primary | ICD-10-CM

## 2020-12-18 DIAGNOSIS — I10 ESSENTIAL HYPERTENSION: ICD-10-CM

## 2020-12-18 PROCEDURE — 93000 ELECTROCARDIOGRAM COMPLETE: CPT | Performed by: INTERNAL MEDICINE

## 2020-12-18 PROCEDURE — 99213 OFFICE O/P EST LOW 20 MIN: CPT | Performed by: INTERNAL MEDICINE

## 2020-12-18 NOTE — PROGRESS NOTES
Chief Complaint   Patient presents with    Hypertension     Annual follow up - denies cardiac sx      1. Have you been to the ER, urgent care clinic since your last visit? Hospitalized since your last visit? No     2. Have you seen or consulted any other health care providers outside of the [address] [place] [address] since your last visit? Include any pap smears or colon screening.   No

## 2021-02-02 RX ORDER — METOPROLOL SUCCINATE 25 MG/1
TABLET, EXTENDED RELEASE ORAL
Qty: 90 TAB | Refills: 2 | Status: SHIPPED | OUTPATIENT
Start: 2021-02-02 | End: 2021-11-26

## 2021-06-30 RX ORDER — RANOLAZINE 1000 MG/1
TABLET, EXTENDED RELEASE ORAL
Qty: 180 TABLET | Refills: 3 | Status: SHIPPED | OUTPATIENT
Start: 2021-06-30 | End: 2022-08-15

## 2022-01-17 ENCOUNTER — OFFICE VISIT (OUTPATIENT)
Dept: CARDIOLOGY CLINIC | Age: 62
End: 2022-01-17
Payer: COMMERCIAL

## 2022-01-17 VITALS
OXYGEN SATURATION: 97 % | HEART RATE: 86 BPM | HEIGHT: 69 IN | DIASTOLIC BLOOD PRESSURE: 80 MMHG | BODY MASS INDEX: 28.99 KG/M2 | WEIGHT: 195.7 LBS | SYSTOLIC BLOOD PRESSURE: 140 MMHG | RESPIRATION RATE: 16 BRPM

## 2022-01-17 DIAGNOSIS — R07.9 CHEST PAIN, UNSPECIFIED TYPE: ICD-10-CM

## 2022-01-17 DIAGNOSIS — R06.09 DOE (DYSPNEA ON EXERTION): Primary | ICD-10-CM

## 2022-01-17 DIAGNOSIS — I10 HYPERTENSION, UNSPECIFIED TYPE: ICD-10-CM

## 2022-01-17 PROCEDURE — 93000 ELECTROCARDIOGRAM COMPLETE: CPT | Performed by: INTERNAL MEDICINE

## 2022-01-17 PROCEDURE — 99214 OFFICE O/P EST MOD 30 MIN: CPT | Performed by: INTERNAL MEDICINE

## 2022-01-17 NOTE — PROGRESS NOTES
Chief Complaint   Patient presents with    Follow-up    Hypertension     1. Have you been to the ER, urgent care clinic since your last visit? No   Hospitalized since your last visit? No    2. Have you seen or consulted any other health care providers outside of the [address] [place] [address] since your last visit? Yes eye exam & teeth cleaning  Include any pap smears or colon screening.  NO

## 2022-01-17 NOTE — PROGRESS NOTES
Rizwan Turner MD          NAME:  Cheyenne Form   :   1960   MRN:   [id number]   PCP:  None           Subjective: The patient is a 57y.o. year old male  who returns for a routine follow-up. Since the last visit, patient reports no CP, but mild BLANCO when walking long distances. Had been sedentary but now retired and more active    Past Medical History:   Diagnosis Date    Long term current use of anticoagulant therapy     ASA 81 mg     Murmur         ICD-10-CM ICD-9-CM    1. BLANCO (dyspnea on exertion)  R06.00 786.09    2. Hypertension, unspecified type  I10 401.9 AMB POC EKG ROUTINE W/ 12 LEADS, INTER & REP   3. Chest pain, unspecified type  R07.9 786.50       Social History     Tobacco Use    Smoking status: Never Smoker    Smokeless tobacco: Never Used   Substance Use Topics    Alcohol use: No      Family History   Problem Relation Age of Onset    Stroke Mother     Coronary Art Dis Father         Review of Systems  Cardiovascular: Negative except as noted in HPI      Objective:       Vitals:    22 1520   BP: (!) 140/80   Pulse: 86   Resp: 16   SpO2: 97%   Weight: 195 lb 11.2 oz (88.8 kg)   Height: 5' 9\" (1.753 m)    Body mass index is 28.9 kg/m². General PE  Mental Status - Alert. General Appearance - Not in acute distress. Chest and Lung Exam   Inspection: Accessory muscles - No use of accessory muscles in breathing. Auscultation:   Breath sounds: - Normal.    Cardiovascular   Inspection: Jugular vein - Bilateral - Inspection Normal.  Palpation/Percussion:   Apical Impulse: - Normal.  Auscultation: Rhythm - Regular. Heart Sounds - S1 WNL and S2 WNL. No S3 or S4. Murmurs & Other Heart Sounds: Auscultation of the heart reveals - No Murmurs. Peripheral Vascular   Upper Extremity: Inspection - Bilateral - No Cyanotic nailbeds or Digital clubbing. Lower Extremity:   Palpation: Edema - Bilateral - No edema.         Data Review:     EKG -  EKG: normal EKG, normal sinus rhythm, unchanged from previous tracings. LABS- @brieflabs@      Allergies reviewed  No Known Allergies    Medications reviewed  Current Outpatient Medications   Medication Sig    metoprolol succinate (TOPROL-XL) 25 mg XL tablet TAKE 1 TABLET BY MOUTH EVERY DAY EVERY NIGHT    ranolazine ER (RANEXA) 1,000 mg TAKE 1 TABLET BY MOUTH TWICE A DAY    aspirin delayed-release 81 mg tablet Take 1 Tab by mouth daily.  nitroglycerin (NITROSTAT) 0.4 mg SL tablet TAKE 1 TAB BY SUBLINGUAL ROUTE EVERY FIVE (5) MINUTES AS NEEDED FOR CHEST PAIN. UP TO 3 DOSES. (Patient not taking: Reported on 1/17/2022)     No current facility-administered medications for this visit. Assessment:       ICD-10-CM ICD-9-CM    1. BLANCO (dyspnea on exertion)  R06.00 786.09    2. Hypertension, unspecified type  I10 401.9 AMB POC EKG ROUTINE W/ 12 LEADS, INTER & REP   3. Chest pain, unspecified type  R07.9 786.50         Orders Placed This Encounter    AMB POC EKG ROUTINE W/ 12 LEADS, INTER & REP     Order Specific Question:   Reason for Exam:     Answer:   routine       Plan:     Mild BLANCO but has been sedentary. Now retired and more active.   I have asked him to report back after a few months of a walking program    Amador Chappell MD

## 2022-03-19 PROBLEM — R07.89 OTHER CHEST PAIN: Status: ACTIVE | Noted: 2018-08-06

## 2022-08-15 ENCOUNTER — HOSPITAL ENCOUNTER (OUTPATIENT)
Age: 62
Setting detail: OUTPATIENT SURGERY
Discharge: HOME | End: 2022-08-15
Attending: INTERNAL MEDICINE | Admitting: INTERNAL MEDICINE
Payer: COMMERCIAL

## 2022-08-15 VITALS
SYSTOLIC BLOOD PRESSURE: 107 MMHG | OXYGEN SATURATION: 99 % | WEIGHT: 188 LBS | HEIGHT: 68 IN | HEART RATE: 67 BPM | BODY MASS INDEX: 28.49 KG/M2 | RESPIRATION RATE: 17 BRPM | TEMPERATURE: 97.7 F | DIASTOLIC BLOOD PRESSURE: 88 MMHG

## 2022-08-15 DIAGNOSIS — R07.9 CHEST PAIN, UNSPECIFIED TYPE: ICD-10-CM

## 2022-08-15 PROCEDURE — 77030019569 HC BND COMPR RAD TERU -B: Performed by: INTERNAL MEDICINE

## 2022-08-15 PROCEDURE — 74011000636 HC RX REV CODE- 636: Performed by: INTERNAL MEDICINE

## 2022-08-15 PROCEDURE — C1894 INTRO/SHEATH, NON-LASER: HCPCS | Performed by: INTERNAL MEDICINE

## 2022-08-15 PROCEDURE — 2709999900 HC NON-CHARGEABLE SUPPLY: Performed by: INTERNAL MEDICINE

## 2022-08-15 PROCEDURE — 77030019698 HC SYR ANGI MDLON MRTM -A: Performed by: INTERNAL MEDICINE

## 2022-08-15 PROCEDURE — 93458 L HRT ARTERY/VENTRICLE ANGIO: CPT | Performed by: INTERNAL MEDICINE

## 2022-08-15 PROCEDURE — 99153 MOD SED SAME PHYS/QHP EA: CPT | Performed by: INTERNAL MEDICINE

## 2022-08-15 PROCEDURE — 77030010221 HC SPLNT WR POS TELE -B: Performed by: INTERNAL MEDICINE

## 2022-08-15 PROCEDURE — 77030008543 HC TBNG MON PRSS MRTM -A: Performed by: INTERNAL MEDICINE

## 2022-08-15 PROCEDURE — 99152 MOD SED SAME PHYS/QHP 5/>YRS: CPT | Performed by: INTERNAL MEDICINE

## 2022-08-15 PROCEDURE — 74011000250 HC RX REV CODE- 250: Performed by: INTERNAL MEDICINE

## 2022-08-15 PROCEDURE — 74011250636 HC RX REV CODE- 250/636: Performed by: INTERNAL MEDICINE

## 2022-08-15 PROCEDURE — 77030015766: Performed by: INTERNAL MEDICINE

## 2022-08-15 RX ORDER — ATROPINE SULFATE 0.1 MG/ML
INJECTION INTRAVENOUS AS NEEDED
Status: DISCONTINUED | OUTPATIENT
Start: 2022-08-15 | End: 2022-08-15 | Stop reason: HOSPADM

## 2022-08-15 RX ORDER — HEPARIN SODIUM 200 [USP'U]/100ML
INJECTION, SOLUTION INTRAVENOUS
Status: COMPLETED | OUTPATIENT
Start: 2022-08-15 | End: 2022-08-15

## 2022-08-15 RX ORDER — AMLODIPINE BESYLATE 5 MG/1
5 TABLET ORAL DAILY
COMMUNITY

## 2022-08-15 RX ORDER — VERAPAMIL HYDROCHLORIDE 2.5 MG/ML
INJECTION, SOLUTION INTRAVENOUS AS NEEDED
Status: DISCONTINUED | OUTPATIENT
Start: 2022-08-15 | End: 2022-08-15 | Stop reason: HOSPADM

## 2022-08-15 RX ORDER — MIDAZOLAM HYDROCHLORIDE 1 MG/ML
INJECTION, SOLUTION INTRAMUSCULAR; INTRAVENOUS AS NEEDED
Status: DISCONTINUED | OUTPATIENT
Start: 2022-08-15 | End: 2022-08-15 | Stop reason: HOSPADM

## 2022-08-15 RX ORDER — FENTANYL CITRATE 50 UG/ML
INJECTION, SOLUTION INTRAMUSCULAR; INTRAVENOUS AS NEEDED
Status: DISCONTINUED | OUTPATIENT
Start: 2022-08-15 | End: 2022-08-15 | Stop reason: HOSPADM

## 2022-08-15 RX ORDER — HEPARIN SODIUM 1000 [USP'U]/ML
INJECTION, SOLUTION INTRAVENOUS; SUBCUTANEOUS AS NEEDED
Status: DISCONTINUED | OUTPATIENT
Start: 2022-08-15 | End: 2022-08-15 | Stop reason: HOSPADM

## 2022-08-15 RX ORDER — LIDOCAINE HYDROCHLORIDE 10 MG/ML
INJECTION, SOLUTION EPIDURAL; INFILTRATION; INTRACAUDAL; PERINEURAL AS NEEDED
Status: DISCONTINUED | OUTPATIENT
Start: 2022-08-15 | End: 2022-08-15 | Stop reason: HOSPADM

## 2022-08-15 NOTE — CARDIO/PULMONARY
Cardiac Rehab Note: chart review/referral     Cardiac cath without intervention 8/15/22    Smoking history assessed. Patient is a non smoker.    Smoking Cessation Program link has not been added to the AVS.

## 2022-08-15 NOTE — PROGRESS NOTES
Cardiac Cath Lab Recovery Arrival Note:      [facility] arrived to Cardiac Cath Lab, Recovery Area. Staff introduced to patient. Patient identifiers verified with NAME and DATE OF BIRTH. Procedure verified with patient. Consent forms reviewed and signed by patient or authorized representative and verified. Allergies verified. Patient and family oriented to department. Patient and family informed of procedure and plan of care. Questions answered with review. Patient prepped for procedure, per orders from physician, prior to arrival.    Patient on cardiac monitor, non-invasive blood pressure, SPO2 monitor. On RA. Patient is A&Ox 4. Patient reports no complaints. Patient in stretcher, in low position, with side rails up, call bell within reach, patient instructed to call if assistance as needed. Patient prep in: [address], [place]. Patient family has pager # none  Family in: sister Camille Tinoco [place].    Prep by: Pita Sport

## 2022-08-15 NOTE — PROGRESS NOTES
Patient ambulated in hallway with steady gait; no complaints of discomfort, dizziness, sob. Right radial site clean dry and intact. Discharge instructions reviewed with patient and patients family answered questions as needed. Transported patient in wheelchair to car.

## 2022-08-15 NOTE — PROGRESS NOTES
Primary Nurse Jonathan Cabrera and Lea Coreas, RN performed a dual skin assessment on this patient No impairment noted  Anand score is 23 ; meplix on sacrum

## 2022-08-15 NOTE — PROGRESS NOTES
Pt received in CCL recovery s/p LHC with Dr Eh Mann. No bleeding, hematoma, or other complications noted at site to rt wrist . Pt denies pain at this time. Pt placed on monitor x 3, vss, ppp. Will continue to monitor.

## 2023-09-26 ENCOUNTER — OFFICE VISIT (OUTPATIENT)
Age: 63
End: 2023-09-26
Payer: COMMERCIAL

## 2023-09-26 VITALS
HEIGHT: 68 IN | BODY MASS INDEX: 28.4 KG/M2 | WEIGHT: 187.4 LBS | RESPIRATION RATE: 18 BRPM | DIASTOLIC BLOOD PRESSURE: 64 MMHG | HEART RATE: 61 BPM | SYSTOLIC BLOOD PRESSURE: 124 MMHG | OXYGEN SATURATION: 98 %

## 2023-09-26 DIAGNOSIS — Z11.59 ENCOUNTER FOR HEPATITIS C SCREENING TEST FOR LOW RISK PATIENT: ICD-10-CM

## 2023-09-26 DIAGNOSIS — I10 ESSENTIAL (PRIMARY) HYPERTENSION: ICD-10-CM

## 2023-09-26 DIAGNOSIS — Z76.89 ENCOUNTER TO ESTABLISH CARE: Primary | ICD-10-CM

## 2023-09-26 DIAGNOSIS — Z12.5 SCREENING FOR MALIGNANT NEOPLASM OF PROSTATE: ICD-10-CM

## 2023-09-26 DIAGNOSIS — E78.2 MIXED HYPERLIPIDEMIA: ICD-10-CM

## 2023-09-26 PROCEDURE — 3074F SYST BP LT 130 MM HG: CPT

## 2023-09-26 PROCEDURE — 3078F DIAST BP <80 MM HG: CPT

## 2023-09-26 PROCEDURE — 99204 OFFICE O/P NEW MOD 45 MIN: CPT

## 2023-09-26 RX ORDER — AMLODIPINE BESYLATE 5 MG/1
5 TABLET ORAL DAILY
Qty: 90 TABLET | Refills: 1 | Status: SHIPPED | OUTPATIENT
Start: 2023-11-28

## 2023-09-26 SDOH — ECONOMIC STABILITY: FOOD INSECURITY: WITHIN THE PAST 12 MONTHS, THE FOOD YOU BOUGHT JUST DIDN'T LAST AND YOU DIDN'T HAVE MONEY TO GET MORE.: NEVER TRUE

## 2023-09-26 SDOH — ECONOMIC STABILITY: HOUSING INSECURITY
IN THE LAST 12 MONTHS, WAS THERE A TIME WHEN YOU DID NOT HAVE A STEADY PLACE TO SLEEP OR SLEPT IN A SHELTER (INCLUDING NOW)?: NO

## 2023-09-26 SDOH — ECONOMIC STABILITY: FOOD INSECURITY: WITHIN THE PAST 12 MONTHS, YOU WORRIED THAT YOUR FOOD WOULD RUN OUT BEFORE YOU GOT MONEY TO BUY MORE.: NEVER TRUE

## 2023-09-26 SDOH — ECONOMIC STABILITY: INCOME INSECURITY: HOW HARD IS IT FOR YOU TO PAY FOR THE VERY BASICS LIKE FOOD, HOUSING, MEDICAL CARE, AND HEATING?: NOT HARD AT ALL

## 2023-09-26 ASSESSMENT — PATIENT HEALTH QUESTIONNAIRE - PHQ9
1. LITTLE INTEREST OR PLEASURE IN DOING THINGS: 0
SUM OF ALL RESPONSES TO PHQ QUESTIONS 1-9: 0
2. FEELING DOWN, DEPRESSED OR HOPELESS: 0
SUM OF ALL RESPONSES TO PHQ QUESTIONS 1-9: 0
SUM OF ALL RESPONSES TO PHQ9 QUESTIONS 1 & 2: 0
SUM OF ALL RESPONSES TO PHQ QUESTIONS 1-9: 0
SUM OF ALL RESPONSES TO PHQ QUESTIONS 1-9: 0

## 2023-09-26 ASSESSMENT — ENCOUNTER SYMPTOMS
EYES NEGATIVE: 1
BLOOD IN STOOL: 0
RESPIRATORY NEGATIVE: 1
CONSTIPATION: 0
SHORTNESS OF BREATH: 0
ALLERGIC/IMMUNOLOGIC NEGATIVE: 1
WHEEZING: 0
DIARRHEA: 0
COUGH: 0

## 2023-09-26 NOTE — PROGRESS NOTES
Chief Complaint   Patient presents with    New Patient     Just got over lyme's disease        HPI:    Donavan Mancia is a 58 y.o. male who presents to [facility] care. 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 HTN:  Well-controlled on amlodipine. Notes a five-year history of intermittent chest pain for which he has been following with Dr. Samuel Borges; had normal cardiac cath August 2022. Continues to follow annually with cardiology. No Known Allergies    Current Outpatient Medications   Medication Sig Dispense Refill    [START ON 11/28/2023] amLODIPine (NORVASC) 5 MG tablet Take 1 tablet by mouth daily 90 tablet 1     No current facility-administered medications for this visit. Past Medical History:   Diagnosis Date    Long term current use of anticoagulant therapy     ASA 81 mg     Murmur        Family History   Problem Relation Age of Onset    Stroke Mother     Coronary Art Dis Father        Review of Systems   Constitutional:  Negative for chills, fatigue and fever. HENT: Negative. Eyes: Negative. Respiratory: Negative. Negative for cough, shortness of breath and wheezing. Cardiovascular:  Negative for chest pain, palpitations and leg swelling. Gastrointestinal:  Negative for blood in stool, constipation and diarrhea. Endocrine: Negative. Genitourinary: Negative. Musculoskeletal: Negative. Skin: Negative. Allergic/Immunologic: Negative. Neurological: Negative. Negative for dizziness and headaches. Hematological: Negative. Psychiatric/Behavioral: Negative. Negative for dysphoric mood and sleep disturbance. The patient is not nervous/anxious.            Vitals:    09/26/23 0802   BP: 124/64   Pulse: 61   Resp: 18   SpO2: 98%

## 2023-09-27 LAB
ALBUMIN SERPL-MCNC: 3.9 G/DL (ref 3.5–5)
ALBUMIN/GLOB SERPL: 1.2 (ref 1.1–2.2)
ALP SERPL-CCNC: 66 U/L (ref 45–117)
ALT SERPL-CCNC: 50 U/L (ref 12–78)
ANION GAP SERPL CALC-SCNC: 7 MMOL/L (ref 5–15)
AST SERPL-CCNC: 23 U/L (ref 15–37)
BASOPHILS # BLD: 0.1 K/UL (ref 0–0.1)
BASOPHILS NFR BLD: 1 % (ref 0–1)
BILIRUB SERPL-MCNC: 0.4 MG/DL (ref 0.2–1)
BUN SERPL-MCNC: 20 MG/DL (ref 6–20)
BUN/CREAT SERPL: 14 (ref 12–20)
CALCIUM SERPL-MCNC: 8.9 MG/DL (ref 8.5–10.1)
CHLORIDE SERPL-SCNC: 109 MMOL/L (ref 97–108)
CHOLEST SERPL-MCNC: 163 MG/DL
CO2 SERPL-SCNC: 24 MMOL/L (ref 21–32)
CREAT SERPL-MCNC: 1.38 MG/DL (ref 0.7–1.3)
DIFFERENTIAL METHOD BLD: ABNORMAL
EOSINOPHIL # BLD: 0.3 K/UL (ref 0–0.4)
EOSINOPHIL NFR BLD: 3 % (ref 0–7)
ERYTHROCYTE [DISTWIDTH] IN BLOOD BY AUTOMATED COUNT: 13 % (ref 11.5–14.5)
GLOBULIN SER CALC-MCNC: 3.3 G/DL (ref 2–4)
GLUCOSE SERPL-MCNC: 103 MG/DL (ref 65–100)
HCT VFR BLD AUTO: 44.7 % (ref 36.6–50.3)
HCV AB SER IA-ACNC: 0.1 INDEX
HCV AB SERPL QL IA: NONREACTIVE
HDLC SERPL-MCNC: 33 MG/DL
HDLC SERPL: 4.9 (ref 0–5)
HGB BLD-MCNC: 14.3 G/DL (ref 12.1–17)
IMM GRANULOCYTES # BLD AUTO: 0.1 K/UL (ref 0–0.04)
IMM GRANULOCYTES NFR BLD AUTO: 1 % (ref 0–0.5)
LDLC SERPL CALC-MCNC: 84.6 MG/DL (ref 0–100)
LYMPHOCYTES # BLD: 2 K/UL (ref 0.8–3.5)
LYMPHOCYTES NFR BLD: 25 % (ref 12–49)
MCH RBC QN AUTO: 29.5 PG (ref 26–34)
MCHC RBC AUTO-ENTMCNC: 32 G/DL (ref 30–36.5)
MCV RBC AUTO: 92.4 FL (ref 80–99)
MONOCYTES # BLD: 0.9 K/UL (ref 0–1)
MONOCYTES NFR BLD: 11 % (ref 5–13)
NEUTS SEG # BLD: 4.7 K/UL (ref 1.8–8)
NEUTS SEG NFR BLD: 59 % (ref 32–75)
NRBC # BLD: 0 K/UL (ref 0–0.01)
NRBC BLD-RTO: 0 PER 100 WBC
PLATELET # BLD AUTO: 195 K/UL (ref 150–400)
PMV BLD AUTO: 11.5 FL (ref 8.9–12.9)
POTASSIUM SERPL-SCNC: 4.2 MMOL/L (ref 3.5–5.1)
PROT SERPL-MCNC: 7.2 G/DL (ref 6.4–8.2)
PSA SERPL-MCNC: 0.6 NG/ML (ref 0.01–4)
RBC # BLD AUTO: 4.84 M/UL (ref 4.1–5.7)
SODIUM SERPL-SCNC: 140 MMOL/L (ref 136–145)
TRIGL SERPL-MCNC: 227 MG/DL
TSH SERPL DL<=0.05 MIU/L-ACNC: 2.13 UIU/ML (ref 0.36–3.74)
VLDLC SERPL CALC-MCNC: 45.4 MG/DL
WBC # BLD AUTO: 8 K/UL (ref 4.1–11.1)

## 2024-06-08 DIAGNOSIS — I10 ESSENTIAL (PRIMARY) HYPERTENSION: ICD-10-CM

## 2024-06-10 RX ORDER — AMLODIPINE BESYLATE 5 MG/1
5 TABLET ORAL DAILY
Qty: 90 TABLET | Refills: 0 | Status: SHIPPED | OUTPATIENT
Start: 2024-06-10

## 2024-06-10 NOTE — TELEPHONE ENCOUNTER
Patient requesting refill on     Requested Prescriptions     Pending Prescriptions Disp Refills    amLODIPine (NORVASC) 5 MG tablet [Pharmacy Med Name: AMLODIPINE TAB 5MG] 90 tablet 1     Sig: TAKE 1 TABLET DAILY        Last OV 9/26/2023

## 2024-08-30 DIAGNOSIS — I10 ESSENTIAL (PRIMARY) HYPERTENSION: ICD-10-CM

## 2024-08-30 NOTE — TELEPHONE ENCOUNTER
Patient requesting refill on     Requested Prescriptions     Pending Prescriptions Disp Refills    amLODIPine (NORVASC) 5 MG tablet [Pharmacy Med Name: AMLODIPINE TAB 5MG] 90 tablet 0     Sig: TAKE 1 TABLET DAILY        Last OV 9/26/2023

## 2024-09-03 RX ORDER — AMLODIPINE BESYLATE 5 MG/1
5 TABLET ORAL DAILY
Qty: 90 TABLET | Refills: 0 | Status: SHIPPED | OUTPATIENT
Start: 2024-09-03

## 2024-09-16 ENCOUNTER — OFFICE VISIT (OUTPATIENT)
Age: 64
End: 2024-09-16
Payer: MEDICARE

## 2024-09-16 VITALS
RESPIRATION RATE: 18 BRPM | BODY MASS INDEX: 26.98 KG/M2 | WEIGHT: 178 LBS | TEMPERATURE: 97.5 F | SYSTOLIC BLOOD PRESSURE: 100 MMHG | HEIGHT: 68 IN | DIASTOLIC BLOOD PRESSURE: 60 MMHG | OXYGEN SATURATION: 98 % | HEART RATE: 57 BPM

## 2024-09-16 DIAGNOSIS — E78.2 MIXED HYPERLIPIDEMIA: ICD-10-CM

## 2024-09-16 DIAGNOSIS — Z28.21 PNEUMOCOCCAL VACCINATION DECLINED: ICD-10-CM

## 2024-09-16 DIAGNOSIS — Z13.1 SCREENING FOR DIABETES MELLITUS: ICD-10-CM

## 2024-09-16 DIAGNOSIS — I10 ESSENTIAL (PRIMARY) HYPERTENSION: Primary | ICD-10-CM

## 2024-09-16 DIAGNOSIS — B35.1 ONYCHOMYCOSIS: ICD-10-CM

## 2024-09-16 DIAGNOSIS — Z12.11 COLON CANCER SCREENING: ICD-10-CM

## 2024-09-16 DIAGNOSIS — Z12.5 SCREENING FOR MALIGNANT NEOPLASM OF PROSTATE: ICD-10-CM

## 2024-09-16 PROCEDURE — 36415 COLL VENOUS BLD VENIPUNCTURE: CPT

## 2024-09-16 PROCEDURE — G8427 DOCREV CUR MEDS BY ELIG CLIN: HCPCS

## 2024-09-16 PROCEDURE — 3017F COLORECTAL CA SCREEN DOC REV: CPT

## 2024-09-16 PROCEDURE — 99214 OFFICE O/P EST MOD 30 MIN: CPT

## 2024-09-16 PROCEDURE — 3074F SYST BP LT 130 MM HG: CPT

## 2024-09-16 PROCEDURE — 1123F ACP DISCUSS/DSCN MKR DOCD: CPT

## 2024-09-16 PROCEDURE — G8419 CALC BMI OUT NRM PARAM NOF/U: HCPCS

## 2024-09-16 PROCEDURE — 3078F DIAST BP <80 MM HG: CPT

## 2024-09-16 PROCEDURE — 1036F TOBACCO NON-USER: CPT

## 2024-09-16 ASSESSMENT — ENCOUNTER SYMPTOMS
BLOOD IN STOOL: 0
SHORTNESS OF BREATH: 0
CONSTIPATION: 0
COUGH: 0
ALLERGIC/IMMUNOLOGIC NEGATIVE: 1
DIARRHEA: 0
ROS SKIN COMMENTS: SEE HPI
WHEEZING: 0
RESPIRATORY NEGATIVE: 1
EYES NEGATIVE: 1

## 2024-09-16 ASSESSMENT — PATIENT HEALTH QUESTIONNAIRE - PHQ9
SUM OF ALL RESPONSES TO PHQ QUESTIONS 1-9: 0
1. LITTLE INTEREST OR PLEASURE IN DOING THINGS: NOT AT ALL
SUM OF ALL RESPONSES TO PHQ9 QUESTIONS 1 & 2: 0
SUM OF ALL RESPONSES TO PHQ QUESTIONS 1-9: 0
2. FEELING DOWN, DEPRESSED OR HOPELESS: NOT AT ALL

## 2024-09-17 LAB
ALBUMIN SERPL-MCNC: 3.8 G/DL (ref 3.5–5)
ALBUMIN/GLOB SERPL: 1.2 (ref 1.1–2.2)
ALP SERPL-CCNC: 53 U/L (ref 45–117)
ALT SERPL-CCNC: 35 U/L (ref 12–78)
ANION GAP SERPL CALC-SCNC: 6 MMOL/L (ref 2–12)
AST SERPL-CCNC: 16 U/L (ref 15–37)
BASOPHILS # BLD: 0.1 K/UL (ref 0–0.1)
BASOPHILS NFR BLD: 1 % (ref 0–1)
BILIRUB SERPL-MCNC: 0.4 MG/DL (ref 0.2–1)
BUN SERPL-MCNC: 23 MG/DL (ref 6–20)
BUN/CREAT SERPL: 18 (ref 12–20)
CALCIUM SERPL-MCNC: 9 MG/DL (ref 8.5–10.1)
CHLORIDE SERPL-SCNC: 107 MMOL/L (ref 97–108)
CHOLEST SERPL-MCNC: 146 MG/DL
CO2 SERPL-SCNC: 27 MMOL/L (ref 21–32)
CREAT SERPL-MCNC: 1.31 MG/DL (ref 0.7–1.3)
DIFFERENTIAL METHOD BLD: ABNORMAL
EOSINOPHIL # BLD: 0.2 K/UL (ref 0–0.4)
EOSINOPHIL NFR BLD: 3 % (ref 0–7)
ERYTHROCYTE [DISTWIDTH] IN BLOOD BY AUTOMATED COUNT: 13.6 % (ref 11.5–14.5)
EST. AVERAGE GLUCOSE BLD GHB EST-MCNC: 108 MG/DL
GLOBULIN SER CALC-MCNC: 3.1 G/DL (ref 2–4)
GLUCOSE SERPL-MCNC: 103 MG/DL (ref 65–100)
HBA1C MFR BLD: 5.4 % (ref 4–5.6)
HCT VFR BLD AUTO: 42.8 % (ref 36.6–50.3)
HDLC SERPL-MCNC: 38 MG/DL
HDLC SERPL: 3.8 (ref 0–5)
HGB BLD-MCNC: 13.6 G/DL (ref 12.1–17)
IMM GRANULOCYTES # BLD AUTO: 0.1 K/UL (ref 0–0.04)
IMM GRANULOCYTES NFR BLD AUTO: 1 % (ref 0–0.5)
LDLC SERPL CALC-MCNC: 83.2 MG/DL (ref 0–100)
LYMPHOCYTES # BLD: 2 K/UL (ref 0.8–3.5)
LYMPHOCYTES NFR BLD: 24 % (ref 12–49)
MCH RBC QN AUTO: 29.5 PG (ref 26–34)
MCHC RBC AUTO-ENTMCNC: 31.8 G/DL (ref 30–36.5)
MCV RBC AUTO: 92.8 FL (ref 80–99)
MONOCYTES # BLD: 0.8 K/UL (ref 0–1)
MONOCYTES NFR BLD: 9 % (ref 5–13)
NEUTS SEG # BLD: 5.1 K/UL (ref 1.8–8)
NEUTS SEG NFR BLD: 62 % (ref 32–75)
NRBC # BLD: 0 K/UL (ref 0–0.01)
NRBC BLD-RTO: 0 PER 100 WBC
PLATELET # BLD AUTO: 195 K/UL (ref 150–400)
PMV BLD AUTO: 10.9 FL (ref 8.9–12.9)
POTASSIUM SERPL-SCNC: 4.4 MMOL/L (ref 3.5–5.1)
PROT SERPL-MCNC: 6.9 G/DL (ref 6.4–8.2)
PSA SERPL-MCNC: 0.5 NG/ML (ref 0.01–4)
RBC # BLD AUTO: 4.61 M/UL (ref 4.1–5.7)
SODIUM SERPL-SCNC: 140 MMOL/L (ref 136–145)
TRIGL SERPL-MCNC: 124 MG/DL
VLDLC SERPL CALC-MCNC: 24.8 MG/DL
WBC # BLD AUTO: 8.1 K/UL (ref 4.1–11.1)

## 2024-12-02 DIAGNOSIS — I10 ESSENTIAL (PRIMARY) HYPERTENSION: ICD-10-CM

## 2024-12-02 RX ORDER — AMLODIPINE BESYLATE 5 MG/1
5 TABLET ORAL DAILY
Qty: 90 TABLET | Refills: 1 | Status: SHIPPED | OUTPATIENT
Start: 2024-12-02

## 2024-12-02 NOTE — TELEPHONE ENCOUNTER
Patient requesting refill on     Requested Prescriptions     Pending Prescriptions Disp Refills    amLODIPine (NORVASC) 5 MG tablet [Pharmacy Med Name: AMLODIPINE TAB 5MG] 90 tablet 0     Sig: TAKE 1 TABLET DAILY        Last OV 9/16/2024

## 2025-02-17 ENCOUNTER — TELEPHONE (OUTPATIENT)
Age: 65
End: 2025-02-17

## 2025-02-17 NOTE — TELEPHONE ENCOUNTER
Please call Lars Elliott . Pt is being discharged later today from [facility] This is a [facility] follow up  Appointment  is on 2/25     Call back number is [phone]

## 2025-02-18 NOTE — TELEPHONE ENCOUNTER
Care Transitions Initial Follow Up Call    Outreach made within 2 business days of discharge: Yes    Patient: Lars Elliott Patient : 1960   MRN: [id number]  Reason for Admission: MINI Stroke  Discharge Date:  2025       Spoke with: patient    Discharge department/facility: [facility] Interactive Patient Contact:  Was patient able to fill all prescriptions: Yes  Was patient instructed to bring all medications to the follow-up visit: Yes  Is patient taking all medications as directed in the discharge summary? Yes  Does patient understand their discharge instructions: Yes  Does patient have questions or concerns that need addressed prior to 7-14 day follow up office visit: yes - 2025    Additional needs identified to be addressed with provider  No needs identified             Scheduled appointment with PCP within 7-14 days    Follow Up  Future Appointments   Date Time Provider Department Center   2025 11:30 AM Jackie Howard APRN - NP [facility]       Heike Pelayo RN

## 2025-03-14 ENCOUNTER — OFFICE VISIT (OUTPATIENT)
Age: 65
End: 2025-03-14
Payer: MEDICARE

## 2025-03-14 VITALS
SYSTOLIC BLOOD PRESSURE: 114 MMHG | BODY MASS INDEX: 28.89 KG/M2 | HEART RATE: 59 BPM | DIASTOLIC BLOOD PRESSURE: 60 MMHG | RESPIRATION RATE: 18 BRPM | WEIGHT: 190 LBS | TEMPERATURE: 98.6 F | OXYGEN SATURATION: 98 %

## 2025-03-14 DIAGNOSIS — G89.29 CHRONIC NONINTRACTABLE HEADACHE, UNSPECIFIED HEADACHE TYPE: Primary | ICD-10-CM

## 2025-03-14 DIAGNOSIS — E78.2 MIXED HYPERLIPIDEMIA: ICD-10-CM

## 2025-03-14 DIAGNOSIS — I10 ESSENTIAL (PRIMARY) HYPERTENSION: ICD-10-CM

## 2025-03-14 DIAGNOSIS — R51.9 CHRONIC NONINTRACTABLE HEADACHE, UNSPECIFIED HEADACHE TYPE: Primary | ICD-10-CM

## 2025-03-14 DIAGNOSIS — M43.6 NECK STIFFNESS: ICD-10-CM

## 2025-03-14 DIAGNOSIS — Z86.73 HX OF TRANSIENT ISCHEMIC ATTACK (TIA): ICD-10-CM

## 2025-03-14 DIAGNOSIS — R10.13 DYSPEPSIA: ICD-10-CM

## 2025-03-14 DIAGNOSIS — R07.89 OTHER CHEST PAIN: ICD-10-CM

## 2025-03-14 PROCEDURE — 1123F ACP DISCUSS/DSCN MKR DOCD: CPT

## 2025-03-14 PROCEDURE — 99215 OFFICE O/P EST HI 40 MIN: CPT

## 2025-03-14 PROCEDURE — 3074F SYST BP LT 130 MM HG: CPT

## 2025-03-14 PROCEDURE — G8427 DOCREV CUR MEDS BY ELIG CLIN: HCPCS

## 2025-03-14 PROCEDURE — 3017F COLORECTAL CA SCREEN DOC REV: CPT

## 2025-03-14 PROCEDURE — G8419 CALC BMI OUT NRM PARAM NOF/U: HCPCS

## 2025-03-14 PROCEDURE — 3078F DIAST BP <80 MM HG: CPT

## 2025-03-14 PROCEDURE — 1036F TOBACCO NON-USER: CPT

## 2025-03-14 PROCEDURE — 36415 COLL VENOUS BLD VENIPUNCTURE: CPT

## 2025-03-14 RX ORDER — OMEPRAZOLE 40 MG/1
40 CAPSULE, DELAYED RELEASE ORAL
Qty: 90 CAPSULE | Refills: 0 | Status: SHIPPED | OUTPATIENT
Start: 2025-03-14

## 2025-03-14 RX ORDER — ASPIRIN 81 MG/1
81 TABLET, CHEWABLE ORAL DAILY
COMMUNITY
Start: 2025-02-18

## 2025-03-14 RX ORDER — ATORVASTATIN CALCIUM 40 MG/1
40 TABLET, FILM COATED ORAL NIGHTLY
COMMUNITY
Start: 2025-02-17 | End: 2025-03-14 | Stop reason: SDUPTHER

## 2025-03-14 RX ORDER — ATORVASTATIN CALCIUM 40 MG/1
40 TABLET, FILM COATED ORAL NIGHTLY
Qty: 90 TABLET | Refills: 3 | Status: SHIPPED | OUTPATIENT
Start: 2025-03-14

## 2025-03-14 ASSESSMENT — PATIENT HEALTH QUESTIONNAIRE - PHQ9
2. FEELING DOWN, DEPRESSED OR HOPELESS: NOT AT ALL
SUM OF ALL RESPONSES TO PHQ QUESTIONS 1-9: 0
SUM OF ALL RESPONSES TO PHQ QUESTIONS 1-9: 0
1. LITTLE INTEREST OR PLEASURE IN DOING THINGS: NOT AT ALL
SUM OF ALL RESPONSES TO PHQ QUESTIONS 1-9: 0
SUM OF ALL RESPONSES TO PHQ QUESTIONS 1-9: 0

## 2025-03-14 NOTE — PROGRESS NOTES
\"Have you been to the ER, urgent care clinic since your last visit?  Hospitalized since your last visit?\"    NO    “Have you seen or consulted any other health care providers outside our system since your last visit?”    NO    “Have you had a colorectal cancer screening such as a colonoscopy/FIT/Cologuard?    NO    No colonoscopy on file  No cologuard on file  No FIT/FOBT on file   No flexible sigmoidoscopy on file              Chief Complaint   Patient presents with    Follow-Up from Hospital     TIA   C/o slight headaches        Vitals:    03/14/25 1508   BP: 114/60   Pulse: 59   Resp: 18   Temp: 98.6 °F (37 °C)   SpO2: 98%       Labs drawn from right per Jackie Howard NP's orders. Patient tolerated well.

## 2025-03-15 LAB
ALBUMIN SERPL-MCNC: 4.1 G/DL (ref 3.5–5)
ALBUMIN/GLOB SERPL: 1.3 (ref 1.1–2.2)
ALP SERPL-CCNC: 53 U/L (ref 45–117)
ALT SERPL-CCNC: 60 U/L (ref 12–78)
ANION GAP SERPL CALC-SCNC: 4 MMOL/L (ref 2–12)
AST SERPL-CCNC: 39 U/L (ref 15–37)
BASOPHILS # BLD: 0.08 K/UL (ref 0–0.1)
BASOPHILS NFR BLD: 0.8 % (ref 0–1)
BILIRUB SERPL-MCNC: 0.6 MG/DL (ref 0.2–1)
BUN SERPL-MCNC: 22 MG/DL (ref 6–20)
BUN/CREAT SERPL: 17 (ref 12–20)
CALCIUM SERPL-MCNC: 9.1 MG/DL (ref 8.5–10.1)
CHLORIDE SERPL-SCNC: 106 MMOL/L (ref 97–108)
CHOLEST SERPL-MCNC: 107 MG/DL
CO2 SERPL-SCNC: 28 MMOL/L (ref 21–32)
CREAT SERPL-MCNC: 1.33 MG/DL (ref 0.7–1.3)
DIFFERENTIAL METHOD BLD: ABNORMAL
EOSINOPHIL # BLD: 0.18 K/UL (ref 0–0.4)
EOSINOPHIL NFR BLD: 1.8 % (ref 0–7)
ERYTHROCYTE [DISTWIDTH] IN BLOOD BY AUTOMATED COUNT: 13.1 % (ref 11.5–14.5)
GLOBULIN SER CALC-MCNC: 3.2 G/DL (ref 2–4)
GLUCOSE SERPL-MCNC: 90 MG/DL (ref 65–100)
HCT VFR BLD AUTO: 40.7 % (ref 36.6–50.3)
HDLC SERPL-MCNC: 38 MG/DL
HDLC SERPL: 2.8 (ref 0–5)
HGB BLD-MCNC: 13.3 G/DL (ref 12.1–17)
IMM GRANULOCYTES # BLD AUTO: 0.04 K/UL (ref 0–0.04)
IMM GRANULOCYTES NFR BLD AUTO: 0.4 % (ref 0–0.5)
LDLC SERPL CALC-MCNC: 40.8 MG/DL (ref 0–100)
LYMPHOCYTES # BLD: 2.75 K/UL (ref 0.8–3.5)
LYMPHOCYTES NFR BLD: 27 % (ref 12–49)
MCH RBC QN AUTO: 29.8 PG (ref 26–34)
MCHC RBC AUTO-ENTMCNC: 32.7 G/DL (ref 30–36.5)
MCV RBC AUTO: 91.1 FL (ref 80–99)
MONOCYTES # BLD: 1.2 K/UL (ref 0–1)
MONOCYTES NFR BLD: 11.8 % (ref 5–13)
NEUTS SEG # BLD: 5.92 K/UL (ref 1.8–8)
NEUTS SEG NFR BLD: 58.2 % (ref 32–75)
NRBC # BLD: 0 K/UL (ref 0–0.01)
NRBC BLD-RTO: 0 PER 100 WBC
PLATELET # BLD AUTO: 172 K/UL (ref 150–400)
PMV BLD AUTO: 11.7 FL (ref 8.9–12.9)
POTASSIUM SERPL-SCNC: 4.5 MMOL/L (ref 3.5–5.1)
PROT SERPL-MCNC: 7.3 G/DL (ref 6.4–8.2)
RBC # BLD AUTO: 4.47 M/UL (ref 4.1–5.7)
SODIUM SERPL-SCNC: 138 MMOL/L (ref 136–145)
T4 FREE SERPL-MCNC: 1.2 NG/DL (ref 0.8–1.5)
TRIGL SERPL-MCNC: 141 MG/DL
TSH SERPL DL<=0.05 MIU/L-ACNC: 1.69 UIU/ML (ref 0.36–3.74)
VLDLC SERPL CALC-MCNC: 28.2 MG/DL
WBC # BLD AUTO: 10.2 K/UL (ref 4.1–11.1)

## 2025-03-19 ENCOUNTER — RESULTS FOLLOW-UP (OUTPATIENT)
Age: 65
End: 2025-03-19

## 2025-04-07 SDOH — HEALTH STABILITY: PHYSICAL HEALTH: ON AVERAGE, HOW MANY DAYS PER WEEK DO YOU ENGAGE IN MODERATE TO STRENUOUS EXERCISE (LIKE A BRISK WALK)?: 7 DAYS

## 2025-04-07 SDOH — HEALTH STABILITY: PHYSICAL HEALTH: ON AVERAGE, HOW MANY MINUTES DO YOU ENGAGE IN EXERCISE AT THIS LEVEL?: 30 MIN

## 2025-04-07 ASSESSMENT — LIFESTYLE VARIABLES
HOW MANY STANDARD DRINKS CONTAINING ALCOHOL DO YOU HAVE ON A TYPICAL DAY: 0
HOW MANY STANDARD DRINKS CONTAINING ALCOHOL DO YOU HAVE ON A TYPICAL DAY: PATIENT DOES NOT DRINK
HOW OFTEN DO YOU HAVE A DRINK CONTAINING ALCOHOL: NEVER
HOW OFTEN DO YOU HAVE SIX OR MORE DRINKS ON ONE OCCASION: 1
HOW OFTEN DO YOU HAVE A DRINK CONTAINING ALCOHOL: 1

## 2025-04-07 ASSESSMENT — PATIENT HEALTH QUESTIONNAIRE - PHQ9
SUM OF ALL RESPONSES TO PHQ QUESTIONS 1-9: 0
SUM OF ALL RESPONSES TO PHQ QUESTIONS 1-9: 0
1. LITTLE INTEREST OR PLEASURE IN DOING THINGS: NOT AT ALL
SUM OF ALL RESPONSES TO PHQ QUESTIONS 1-9: 0
SUM OF ALL RESPONSES TO PHQ QUESTIONS 1-9: 0
2. FEELING DOWN, DEPRESSED OR HOPELESS: NOT AT ALL

## 2025-04-09 ENCOUNTER — OFFICE VISIT (OUTPATIENT)
Age: 65
End: 2025-04-09

## 2025-04-09 VITALS
DIASTOLIC BLOOD PRESSURE: 78 MMHG | HEART RATE: 68 BPM | WEIGHT: 189 LBS | SYSTOLIC BLOOD PRESSURE: 130 MMHG | RESPIRATION RATE: 18 BRPM | OXYGEN SATURATION: 98 % | TEMPERATURE: 98.7 F | BODY MASS INDEX: 28.74 KG/M2

## 2025-04-09 DIAGNOSIS — E78.2 MIXED HYPERLIPIDEMIA: ICD-10-CM

## 2025-04-09 DIAGNOSIS — R10.13 DYSPEPSIA: ICD-10-CM

## 2025-04-09 DIAGNOSIS — Z00.00 WELCOME TO MEDICARE PREVENTIVE VISIT: Primary | ICD-10-CM

## 2025-04-09 DIAGNOSIS — I10 ESSENTIAL (PRIMARY) HYPERTENSION: ICD-10-CM

## 2025-04-09 DIAGNOSIS — R07.89 OTHER CHEST PAIN: ICD-10-CM

## 2025-04-09 SDOH — ECONOMIC STABILITY: FOOD INSECURITY: WITHIN THE PAST 12 MONTHS, THE FOOD YOU BOUGHT JUST DIDN'T LAST AND YOU DIDN'T HAVE MONEY TO GET MORE.: NEVER TRUE

## 2025-04-09 SDOH — ECONOMIC STABILITY: FOOD INSECURITY: WITHIN THE PAST 12 MONTHS, YOU WORRIED THAT YOUR FOOD WOULD RUN OUT BEFORE YOU GOT MONEY TO BUY MORE.: NEVER TRUE

## 2025-04-09 NOTE — PROGRESS NOTES
\"Have you been to the ER, urgent care clinic since your last visit?  Hospitalized since your last visit?\"    NO    “Have you seen or consulted any other health care providers outside our system since your last visit?”    NO    “Have you had a colorectal cancer screening such as a colonoscopy/FIT/Cologuard?    NO    No colonoscopy on file  No cologuard on file  No FIT/FOBT on file   No flexible sigmoidoscopy on file       Chief Complaint   Patient presents with    Medicare AWV       Vitals:    04/09/25 0832   BP: 130/78   Pulse: 68   Resp: 18   Temp: 98.7 °F (37.1 °C)   SpO2: 98%            
daily Yes Provider, MD Francesca   atorvastatin (LIPITOR) 40 MG tablet Take 1 tablet by mouth nightly Yes Jackie Howard APRN - NP   amLODIPine (NORVASC) 5 MG tablet TAKE 1 TABLET DAILY Yes Jackie Howard APRN - NP       CareTeam (Including outside providers/suppliers regularly involved in providing care):   Patient Care Team:  Jackie Howard APRN - NP as PCP - General (Nurse Practitioner)  Jackie Howard APRN - NP as PCP - Empaneled Provider  Chris Hirsch MD as Physician     Recommendations for Preventive Services Due: see orders and patient instructions/AVS.  Recommended screening schedule for the next 5-10 years is provided to the patient in written form: see Patient Instructions/AVS.     Reviewed and updated this visit:  Allergies  Meds  Problems  Sexual Hx

## 2025-04-09 NOTE — ASSESSMENT & PLAN NOTE
Continue episodic CP concerning for ischemic disease; has f/u with Dr. Babin next week to discuss.  Discussed red flags

## 2025-04-09 NOTE — ACP (ADVANCE CARE PLANNING)
Discussed importance of advanced medical directives with patient.  Patient is capable of making decisions.    Discussed advanced directives 4/9/2025. [place] advance directive form discussed with pt. Pt will consider options and give us written form back for inclusion in chart.    CORIN Costello - NP

## 2025-05-06 DIAGNOSIS — R10.13 DYSPEPSIA: ICD-10-CM

## 2025-05-06 RX ORDER — OMEPRAZOLE 40 MG/1
40 CAPSULE, DELAYED RELEASE ORAL
Qty: 90 CAPSULE | Refills: 1 | Status: SHIPPED | OUTPATIENT
Start: 2025-05-06

## 2025-05-06 NOTE — TELEPHONE ENCOUNTER
Medication Refill Request    Lars Elliott is requesting a refill of the following medication(s):   omeprazole (PRILOSEC) 40 MG delayed release capsule   Please send refill to:     [facility] - [place] - [address] [facility] - P [phone] - F [phone]  [facility]  [place] [address]  Phone: [phone] Fax: [phone]    Looks like it when to [facility] by mistake. Needs 90 day refill.

## 2025-05-23 DIAGNOSIS — I10 ESSENTIAL (PRIMARY) HYPERTENSION: ICD-10-CM

## 2025-05-23 RX ORDER — AMLODIPINE BESYLATE 5 MG/1
5 TABLET ORAL DAILY
Qty: 90 TABLET | Refills: 1 | Status: SHIPPED | OUTPATIENT
Start: 2025-05-23

## 2025-06-06 ENCOUNTER — HOSPITAL ENCOUNTER (OUTPATIENT)
Facility: HOSPITAL | Age: 65
Discharge: HOME | End: 2025-06-09
Attending: INTERNAL MEDICINE
Payer: MEDICARE

## 2025-06-06 VITALS
HEART RATE: 67 BPM | SYSTOLIC BLOOD PRESSURE: 119 MMHG | OXYGEN SATURATION: 98 % | DIASTOLIC BLOOD PRESSURE: 59 MMHG | RESPIRATION RATE: 15 BRPM | TEMPERATURE: 98 F

## 2025-06-06 DIAGNOSIS — I47.20 VENTRICULAR TACHYCARDIA (HCC): ICD-10-CM

## 2025-06-06 PROCEDURE — 6360000004 HC RX CONTRAST MEDICATION: Performed by: INTERNAL MEDICINE

## 2025-06-06 PROCEDURE — 75574 CT ANGIO HRT W/3D IMAGE: CPT | Performed by: INTERNAL MEDICINE

## 2025-06-06 PROCEDURE — 75574 CT ANGIO HRT W/3D IMAGE: CPT

## 2025-06-06 PROCEDURE — 6370000000 HC RX 637 (ALT 250 FOR IP): Performed by: INTERNAL MEDICINE

## 2025-06-06 RX ORDER — NITROGLYCERIN 0.4 MG/1
0.8 TABLET SUBLINGUAL ONCE
Status: DISCONTINUED | OUTPATIENT
Start: 2025-06-06 | End: 2025-06-06

## 2025-06-06 RX ORDER — NITROGLYCERIN 0.4 MG/1
0.4 TABLET SUBLINGUAL ONCE
Status: COMPLETED | OUTPATIENT
Start: 2025-06-06 | End: 2025-06-06

## 2025-06-06 RX ORDER — IOPAMIDOL 755 MG/ML
100 INJECTION, SOLUTION INTRAVASCULAR
Status: COMPLETED | OUTPATIENT
Start: 2025-06-06 | End: 2025-06-06

## 2025-06-06 RX ADMIN — IOPAMIDOL 100 ML: 755 INJECTION, SOLUTION INTRAVENOUS at 11:24

## 2025-06-06 RX ADMIN — NITROGLYCERIN 0.4 MG: 0.4 TABLET SUBLINGUAL at 11:37

## 2025-06-06 ASSESSMENT — PAIN - FUNCTIONAL ASSESSMENT: PAIN_FUNCTIONAL_ASSESSMENT: NONE - DENIES PAIN

## 2025-06-06 NOTE — DISCHARGE INSTRUCTIONS
[facility]  Department of Interventional Radiology  [address] [place] [address] [phone]    CARDIAC CTA DISCHARGE EDUCATION    Information:   You had a Cardiac CTA scan today, where you received medication to lower your heart rate and dilate the blood vessel around your heart. During the scan you also received IV contrast.     What should I expect after the Cardiac CTA?   The medicine may make you feel dizzy because it lowers blood pressure rapidly. You may have been given IV Metoprolol and Nitroglycerin tabs.    Precautions  Limit the amount of alcohol you drink. Too much alcohol can cause dizziness or fainting.  Don't take phosphodiesterase inhibitors for next 48 hours, such as sildenafil (Viagra) or tadalafil (Cialis) at any time if you are on nitroglycerin treatment. These are medicines used to treat sexual dysfunction in men. The combination of nitroglycerin with these medicines can cause a severe drop in blood pressure. This can lead to dizziness, fainting, heart attack, or stroke.  Possible side effects of nitroglycerin. Mild side effects include:  Headache  Dizziness or lightheadedness  Mouth tingling or burning  Edema  Abdominal pain  When to call your healthcare provider   Call your healthcare provider right away if any of the following occur:  Signs of an allergic reaction, such as trouble breathing, tightness in the chest or throat, wheezing, rash, hives, swelling of the mouth, face, or throat.  Severe headache  Severe dizziness, or fainting  Nausea or vomiting  Fast heartbeat (higher than 100 beats per minute), slow heartbeat, or irregular heartbeat  Flushing (redness of the face, neck, or chest)  Blurred eyesight  Dry mouth  Sweating a lot  Pale skin  Restlessness  Swollen ankles  Weakness or tiredness      Follow-up visit information:    Follow up with ordering Physician for result.      If you have any questions or concerns, please call

## (undated) DEVICE — TR BAND RADIAL ARTERY COMPRESSION DEVICE: Brand: TR BAND

## (undated) DEVICE — RADIFOCUS OPTITORQUE ANGIOGRAPHIC CATHETER: Brand: OPTITORQUE

## (undated) DEVICE — SPLINT WR POS F/ARTERIAL ACC -- BX/10

## (undated) DEVICE — PROVE COVER: Brand: UNBRANDED

## (undated) DEVICE — GLIDESHEATH SLENDER ACCESS KIT: Brand: GLIDESHEATH SLENDER

## (undated) DEVICE — PACK PROCEDURE SURG HRT CATH

## (undated) DEVICE — SYRINGE ANGIO 10 CC BRL STD PRNT POLYCARB LT BLU MEDALLION

## (undated) DEVICE — TUBING PRSS MON L6IN PVC M FEM CONN

## (undated) DEVICE — 3M™ TEGADERM™ TRANSPARENT FILM DRESSING FRAME STYLE, 1626W, 4 IN X 4-3/4 IN (10 CM X 12 CM), 50/CT 4CT/CASE: Brand: 3M™ TEGADERM™